# Patient Record
Sex: MALE | Employment: STUDENT | URBAN - METROPOLITAN AREA
[De-identification: names, ages, dates, MRNs, and addresses within clinical notes are randomized per-mention and may not be internally consistent; named-entity substitution may affect disease eponyms.]

---

## 2022-12-08 ENCOUNTER — HOSPITAL ENCOUNTER (OUTPATIENT)
Dept: PHYSICAL THERAPY | Age: 22
Discharge: HOME | End: 2022-12-08
Payer: COMMERCIAL

## 2022-12-08 PROCEDURE — 97161 PT EVAL LOW COMPLEX 20 MIN: CPT

## 2022-12-08 PROCEDURE — 97112 NEUROMUSCULAR REEDUCATION: CPT

## 2022-12-08 NOTE — PROGRESS NOTES
PT DAILY TREATMENT NOTE/KNEE EVAL     Patient Name: Soo Cruz  Date:2022  : 2000  [x]  Patient  Verified  Payor: Marvin Craft / Plan: Sang Burrell HMO / Product Type: HMO /    In time:1050  Out time:1140  Total Treatment Time (min): 50  Visit #: 1 of 10    Medicare/BCBS Only   Total Timed Codes (min):  8 1:1 Treatment Time:       Treatment Area: Left knee pain [M25.562]    SUBJECTIVE    CC: L knee pain  Pain: Current: 6/10 Best: 4/10 Worst: 8/10   Alleviated By: straightening   Aggravated By: prolonged sitting (>20 mins)  PLOF: Independent, Full time student at [employer]  Exercise/Activity Level: [employer] club soccer, weightlifting (leg ext, lunges). Has not returned to walking or running. Mechanism of Injury: Meniscus tear, hyperextending knee while playing soccer in 2022. Microfx of L femoral condyle. Current symptoms/Complaints: On crutches for 8 weeks. Stiffness \"eavy\" with prolonged sitting. Clicking with knee extension 2-3x/day  Previous Treatment/Compliance: Surgery in 2022. Was supposed to do PT starting in October but was not in town, unable to do PT.    Work Hx: IT work, sedentary, full time  Pt Goals: \"return to [employer] rationale: decrease inflammation, decrease pain, and increase tissue extensibility to improve the patients ability to exercise   Min Type Additional Details    [] Estim:  []Unatt       []IFC  []Premod                        []Other:  []w/ice   []w/heat  Position:  Location:    [] Estim: []Att    []TENS instruct  []NMES                    []Other:  []w/US   []w/ice   []w/heat  Position:  Location:    []  Traction: [] Cervical       []Lumbar                       [] Prone          []Supine                       []Intermittent   []Continuous Lbs:  [] before manual  [] after manual    []  Ultrasound: []Continuous   [] Pulsed                           []1MHz   []3MHz Location:  W/cm2:    []  Iontophoresis with dexamethasone         Location: [] Take home patch   [] In clinic   10 [x]  Ice ant L knee    [x]  Heat post L knee  []  Ice massage  []  Laser   []  Anodyne Position: semi-reclined with wedge      []  Laser with stim  []  Other: Position:  Location:    []  Vasopneumatic Device Pressure:       [] lo [] med [] hi   Temperature: [] lo [] med [] hi   [x] Skin assessment post-treatment:  [x]intact []redness- no adverse reaction    []redness - adverse reaction:     32 min [x]Eval                  []Re-Eval        8 min Neuromuscular Re-education:  [x]  See flow sheet :   Rationale: improve coordination, improve balance, and increase proprioception  to improve the patients ability to perform single leg interventions            With   [] TE   [] TA   [x] neuro   [] other: Patient Education:   Pt educated on diagnosis and prognosis, current impairments, postural re-education, goals/role/intent of therapy, and importance of compliance with HEP. Written HEP provided to patient and PT instructed pt on performance. Pt demo good understanding and recall. PT addressed pt questions/concerns regarding their condition. Discussed the following activity modifications:  - Avoid performing provocative movements and activities    Use of heat to reduce stiffness         OBJECTIVE    Posture: Pronation B, Mild ER of R LE, L knee in slight flexion   Observation: wearing patellar brace on R   Gait: WNL  Stairs: WNL, Reciprocal, intermittent use of 1 UE on handrail      AROM : WNL for B hips/knees  Strength (1-5)    Left Right   Hip Flexion 4+ 5    Extension 4+ 4+    Abduction 4+ 4+    Glute Max 4 4   Knee Flexion 4 5    Extension 4+ 5   Ankle PF 4 (x 9) 5 (x 25)    DF 5 5     Flexibility:  Ely's:  (-) B           Hamstrings 90/90: (+) L  Gastroc: (+) B    Palpation:  Denies TTP, notes some tenderness intermittently at L popliteal fossa    Patellar:  Patellar Positioning (Static): unable to assess d/t clothing  Patellar Tracking:  WNL B  Patellar Mobility: Hypermobile (R>L) in all directions    Girth Measurements:  (Cm) L R   Mid-patella 35.5 36   10cm above 40 41   10 cm below 34.5 34     Special Tests    Keith (-) R    Thessaly's (+) L at lateral joint line      Valgus@ 30 Degrees (-) R     Varus@ 30 Degrees (-) R    Patellar Apprehension   (-) R    Balance:   SLS on floor, EO:  L: >30sec, R: >30 sec  SLS on floor, EC: L: 4 sec, R: 11 sec  SLS on foam, EO: L: >30sec, R: >30 sec  SLS on floor, EC: L: 1 sec, R: 10 sec    Other Tests:   Quad Lag: (-) B  Single Leg Hops: x5 with incr translation, decr speed, pain at L jt line  Squat: 90deg, no pain, narrow ANA ROSA, \"heavy, clicks\", clicking decr with wider ANA ROSA, incr weight shift onto R LE  FOTO: 62/100    Pain Level (0-10 scale) post treatment: 0/10    ASSESSMENT/Changes in Function:   Please see POC    Patient will continue to benefit from skilled PT services to modify and progress therapeutic interventions, address functional mobility deficits, address ROM deficits, address strength deficits, analyze and address soft tissue restrictions, analyze and cue movement patterns, analyze and modify body mechanics/ergonomics, assess and modify postural abnormalities, address imbalance/dizziness, and instruct in home and community integration to attain remaining goals.     Justification for Eval Code Complexity:  Patient History: see above  Examination: see exam   Clinical Presentation:  stable  Clinical Decision Making : FOTO : 58 /100      [x]  See Plan of Care  []  See progress note/recertification  []  See Discharge Summary         Progress towards goals / Updated goals:  Please see POC    PLAN  [x]  Upgrade activities as tolerated     [x]  Continue plan of care  []  Update interventions per flow sheet       []  Discharge due to:_  [x]  Other:_  Cont POC 1-22x/week for 8 weeks    Tushar Alejo PT 12/8/2022  9:07 AM

## 2022-12-08 NOTE — PROGRESS NOTES
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   ========================================================================  Assessment / key information:  Pt is a 24 yo male presenting with L knee pain s/sx consistent with MD referral for post-operative pain following L lateral meniscus tear and femoral condyle microfracture (DOS 2022). Pain ranges from 4-8/10, worse with prolonged sitting (>20 mins), improved with stretching. Pt presents with full B hip/knee AROM, decr L knee/ankle strength, and decr standing balance on L. Functional deficits at this time include plyometrics, agility, and running affecting full return to soccer. Pt would benefit from skilled PT to address these deficits to assist pt to full return to PLOF activity. HEP initiated with good pt tolerance noted. There are no red flags at this time.  Further assessment as follows:    Posture: Pronation B, Mild ER of R LE, L knee in slight flexion   Observation: wearing patellar brace on R   Gait: WNL  Stairs: WNL, Reciprocal, intermittent use of 1 UE on handrail      AROM : WNL for B hips/knees  Strength (1-5)    Left Right   Hip Flexion 4+ 5    Extension 4+ 4+    Abduction 4+ 4+    Glute Max 4 4   Knee Flexion 4 5    Extension 4+ 5   Ankle PF 4 (x 9) 5 (x 25)    DF 5 5     Flexibility:  Ely's:  (-) B           Hamstrings 90/90: (+) L  Gastroc: (+) B    Palpation:  Denies TTP, notes some tenderness intermittently at L popliteal fossa    Patellar:  Patellar Positioning (Static): unable to assess d/t clothing  Patellar Tracking:  WNL B  Patellar Mobility: Hypermobile (R>L) in all directions    Girth Measurements:  (Cm) L R   Mid-patella 35.5 36   10cm above 40 41   10 cm below 34.5 34     Special Tests    Keith (-) R    Thessaly's (+) L at lateral joint line      Valgus@ 30 Degrees (-) R     Varus@ 30 Degrees (-) R    Patellar Apprehension   (-) R    Balance:   SLS on floor, EO:  L: >30sec, R: >30 sec  SLS on floor, EC: L: 4 sec, R: 11 sec  SLS on foam, EO: L: >30sec, R: >30 sec  SLS on floor, EC: L: 1 sec, R: 10 sec    Other Tests:   Quad Lag: (-) B  Single Leg Hops: x5 with incr translation, decr speed, pain at L jt line  Squat: 90deg, no pain, narrow ANA ROSA, \"heavy, clicks\", clicking decr with wider ANA ROSA, incr weight shift onto R LE  FOTO: 62/100    ========================================================================  Eval Complexity: History: LOW Complexity : Zero comorbidities / personal factors that will impact the outcome / POCExam:HIGH Complexity : 4+ Standardized tests and measures addressing body structure, function, activity limitation and / or participation in recreation  Presentation: LOW Complexity : Stable, uncomplicated  Clinical Decision Making:MEDIUM Complexity : FOTO score of 26-74Overall Complexity:LOW   Problem List: pain affecting function, decrease strength, impaired gait/ balance, decrease ADL/ functional abilitiies, and decrease activity tolerance    Vasopnuematic compression justification:  Per bilateral girth measures taken and listed above the edema is considered significant and having an impact on the patient's strength, balance, and ADLs     Treatment Plan may include any combination of the following: Therapeutic exercise, Neuromuscular reeducation, Manual therapy, Therapeutic activity, Self care/home management, Electric stim unattended , Vasopneumatic device, and Gait training  Patient / Family readiness to learn indicated by: asking questions, trying to perform skills, and interest  Persons(s) to be included in education: patient (P)  Barriers to Learning/Limitations: None  Measures taken:    Patient Goal (s): \"Return to sports\"   Patient self reported health status: good  Rehabilitation Potential: excellent    Short Term Goals: To be accomplished in  1  week:   1. Pt will be compliant with initial HEP to improve ability to independently manage symptoms. Status at last assessment: reviewed and initiated HEP  Long Term Goals: To be accomplished in  4  weeks:  1. Pt to report FOTO score of at least 82/100 pts to demonstrate improved function and quality of life. Status at last assessment: 62/100  2. Pt to demo at least 20 unilateral heel raises on L LE for improved ankle strength and stability prior to initiating plyometrics. Status at last assessment: x9   3. Pt to demo SLS on floor EC on L LE of at least 10 sec for improved proprioception prior to initiating agility drills  Status at last assessment: 4 sec   4. Pt to demo unilateral squat on L to at least 90 deg without pain and with good mechanics for improved L LE functional strength. Status at last assessment: NT      Frequency / Duration:   Patient to be seen  1-2  times per week for 10  treatments:     Patient / Caregiver education and instruction: activity modification and exercises  Therapist Signature: Gisela Morales PT Date: 09/3/8901   Certification Period: na Time: 9:06 AM   ========================================================================  I certify that the above Physical Therapy Services are being furnished while the patient is under my care.   I agree with the treatment plan and certify that this therapy is necessary. Physician Signature:        Date:       Time: ___                                            Renetta Jones MD.    Please sign and return to [facility] or you may fax the signed copy to [phone]. Thank you.

## 2022-12-16 ENCOUNTER — HOSPITAL ENCOUNTER (OUTPATIENT)
Dept: PHYSICAL THERAPY | Age: 22
Discharge: HOME | End: 2022-12-16
Payer: COMMERCIAL

## 2022-12-16 PROCEDURE — 97110 THERAPEUTIC EXERCISES: CPT

## 2022-12-16 PROCEDURE — 97112 NEUROMUSCULAR REEDUCATION: CPT

## 2022-12-16 PROCEDURE — 97530 THERAPEUTIC ACTIVITIES: CPT

## 2022-12-16 NOTE — PROGRESS NOTES
PT DAILY TREATMENT NOTE     Patient Name: Lux Right  Date:2022  : 2000  [x]  Patient  Verified  Payor: Toro Cruz / Plan: Erika Ramon HMO / Product Type: HMO /    In time: 8:04 am              Out time: 9:08 am  Total Treatment Time (min): 59  Visit #: 2 of 10    Treatment Area: Left knee pain [M25.562]    SUBJECTIVE  Pain Level (0-10 scale): 0  Any medication changes, allergies to medications, adverse drug reactions, diagnosis change, or new procedure performed?: [x] No    [] Yes (see summary sheet for update)  Subjective functional status/changes:   [] No changes reported  \"I am gonna be real with you, I graduate tomorrow, so I only did the exercises like two or three times. \"    OBJECTIVE  Modality rationale: decrease inflammation and decrease pain to improve the patients ability to perform ADLs     Min Type Additional Details    [] Estim: []Att   []Unatt                  []IFC  []Premod                                            []NMES    []Other:  []w/ice   []w/heat  Position:  Location:    []  Traction: [] Cervical       [] Lumbar                       [] Supine        [] Prone                       []Intermittent   []Continuous Lbs:  [] before manual  [] after manual    []  Ultrasound: []Continuous   [] Pulsed                           []1MHz   []3MHz Location:  W/cm2:    []  Iontophoresis with dexamethasone         Location: [] Take home patch   [] In clinic   10 [x]  Ice (anterior)     [x]  Heat (posterior)  []  Ice massage Position: semi-reclined with LEs elevated on wedge  Location: (L) knee    []  Vasopneumatic Device  If using vaso (only need to measure limb vaso being performed on)      pre-treatment girth :       post-treatment girth :       measured at (landmark location)  Pressure: [] lo [] med [] hi   Temp: [] lo [] med [] hi   [x] Skin assessment post-treatment:  [x]intact    []redness- no adverse reaction []redness - adverse reaction:     25 min Therapeutic Exercise:  [x] See flow sheet: initiated per IE   Rationale: increase ROM, increase strength, and improve coordination to improve the patients ability to perform ADLs     14 min Therapeutic Activity:  [x]  See flow sheet: initiated per IE   Rationale: increase strength, improve coordination, improve balance, and increase proprioception  to improve the patients ability to perform ADLs     15 min Neuromuscular Re-education:  [x]  See flow sheet: initiated per IE   Rationale: increase strength, improve coordination, improve balance, and increase proprioception  to improve the patients ability to progress towards eventual return to recreation          with TE min Patient Education: [x] Review HEP from IE     Other Objective/Functional Measures:       Pain Level (0-10 scale) post treatment: 0    ASSESSMENT/Changes in Function:   Good tolerance to treatment today with patient req 100% verbal/tactile cueing and demo for proper form/technique with all newly introduced therex. Patient will continue to benefit from skilled PT services to modify and progress therapeutic interventions, address functional mobility deficits, address ROM deficits, address strength deficits, analyze and address soft tissue restrictions, analyze and cue movement patterns, analyze and modify body mechanics/ergonomics, assess and modify postural abnormalities, and address imbalance/dizziness to attain remaining goals. []  See Plan of Care  []  See progress note/recertification  []  See Discharge Summary         Progress towards goals / Updated goals -:  Short Term Goals: To be accomplished in  1  week:   1. Pt will be compliant with initial HEP to improve ability to independently manage symptoms.   Status at last assessment: reviewed and initiated HEP  Current: goal progressing; pt notes intermittent compliance with intent to increase consistency after graduation tomorrow (12/16/22)  Long Term Goals: To be accomplished in  4  weeks:  1. Pt to report FOTO score of at least 82/100 pts to demonstrate improved function and quality of life. Status at last assessment: 62/100  2. Pt to demo at least 20 unilateral heel raises on L LE for improved ankle strength and stability prior to initiating plyometrics. Status at last assessment: x9   3. Pt to demo SLS on floor EC on L LE of at least 10 sec for improved proprioception prior to initiating agility drills  Status at last assessment: 4 sec   4. Pt to demo unilateral squat on L to at least 90 deg without pain and with good mechanics for improved L LE functional strength.   Status at last assessment: NT   Current: goal progressing; pt demos unilateral squat to 60 deg without pain and good mechanics, quadriceps fasciculations (12/16/22)   (Progress Note due by 1/8/22)    PLAN  [x]  Upgrade activities as tolerated     [x]  Continue plan of care  []  Update interventions per flow sheet       []  Discharge due to:_  [x]  Other: assess response to treatment     Sandra Bowens, PTA 12/16/2022

## 2022-12-22 ENCOUNTER — HOSPITAL ENCOUNTER (OUTPATIENT)
Dept: PHYSICAL THERAPY | Age: 22
Discharge: HOME | End: 2022-12-22
Payer: COMMERCIAL

## 2022-12-22 PROCEDURE — 97110 THERAPEUTIC EXERCISES: CPT

## 2022-12-22 PROCEDURE — 97112 NEUROMUSCULAR REEDUCATION: CPT

## 2022-12-22 PROCEDURE — 97530 THERAPEUTIC ACTIVITIES: CPT

## 2022-12-22 NOTE — PROGRESS NOTES
PT DAILY TREATMENT NOTE 8-14    Patient Name: Gill Benson  Date:2022  : 2000  [x]  Patient  Verified  Payor: Adebayo August / Plan: Yolanda Paredes HMO / Product Type: HMO /    In time: 1055              Out time: 1150   Total Treatment Time (min): 54  Visit #: 3 of 10    Treatment Area: Left knee pain [M25.562]    SUBJECTIVE  Pain Level (0-10 scale): 0  Any medication changes, allergies to medications, adverse drug reactions, diagnosis change, or new procedure performed?: [x] No    [] Yes (see summary sheet for update)  Subjective functional status/changes:   [] No changes reported  \"The heat behind my knee is helping. \"    OBJECTIVE  Modality rationale: decrease inflammation and decrease pain to improve the patients ability to perform ADLs     Min Type Additional Details    [] Estim: []Att   []Unatt                  []IFC  []Premod                                            []NMES    []Other:  []w/ice   []w/heat  Position:  Location:    []  Traction: [] Cervical       [] Lumbar                       [] Supine        [] Prone                       []Intermittent   []Continuous Lbs:  [] before manual  [] after manual    []  Ultrasound: []Continuous   [] Pulsed                           []1MHz   []3MHz Location:  W/cm2:    []  Iontophoresis with dexamethasone         Location: [] Take home patch   [] In clinic   10 [x]  Ice (anterior)     [x]  Heat (posterior)  []  Ice massage Position: semi-reclined with LEs elevated on wedge  Location: (L) knee    []  Vasopneumatic Device  If using vaso (only need to measure limb vaso being performed on)      pre-treatment girth :       post-treatment girth :       measured at (landmark location)  Pressure: [] lo [] med [] hi   Temp: [] lo [] med [] hi   [x] Skin assessment post-treatment:  [x]intact    []redness- no adverse reaction                                                                                 []redness - adverse reaction:     24 min Therapeutic Exercise: [x] See flow sheet:   Active warm up, 2 laps each: Hamstring kicks, Open/close the gaits, heel walk/toe walk    Agility Drills: 2 laps of each  In/In/Out/Out forward  2 forward/1 back   Lateral 2 over/1back     Rationale: increase ROM, increase strength, and improve coordination to improve the patients ability to perform ADLs     8 min Therapeutic Activity:  [x]  See flow sheet :   Rationale: increase strength, improve coordination, improve balance, and increase proprioception to improve the patient's ability to perform squats, lunges, and plyometrics. 13 min Neuromuscular Re-education:  [x]  See flow sheet:    Rationale: increase strength, improve coordination, improve balance, and increase proprioception  to improve the patients ability to progress towards eventual return to recreation          with TE min Patient Education: [x] Review HEP      Other Objective/Functional Measures:   SLS on floor EC: B: >30 sec  SLS on foam EC: L: 28 sec, R: 20 sec  S/l Heel raise: x15 on L    Pain Level (0-10 scale) post treatment: 0    ASSESSMENT/Changes in Function:   Pt met LTG #3 for SLS on floor EC, demos almost equal ability on foam. Tolerates all interventions well today without pain. Plan to cont L LE strengthening, plyos, and agility in progress toward to soccer. Patient will continue to benefit from skilled PT services to modify and progress therapeutic interventions, address functional mobility deficits, address ROM deficits, address strength deficits, analyze and address soft tissue restrictions, analyze and cue movement patterns, analyze and modify body mechanics/ergonomics, assess and modify postural abnormalities, and address imbalance/dizziness to attain remaining goals. []  See Plan of Care  []  See progress note/recertification  []  See Discharge Summary         Progress towards goals / Updated goals -:  Short Term Goals: To be accomplished in  1  week:   1.  Pt will be compliant with initial HEP to improve ability to independently manage symptoms. Status at last assessment: reviewed and initiated HEP  Current: goal progressing; pt notes intermittent compliance with intent to increase consistency after graduation tomorrow (12/16/22)  Long Term Goals: To be accomplished in  4  weeks:  1. Pt to report FOTO score of at least 82/100 pts to demonstrate improved function and quality of life. Status at last assessment: 62/100  2. Pt to demo at least 20 unilateral heel raises on L LE for improved ankle strength and stability prior to initiating plyometrics. Status at last assessment: x9   Current 12/22/22 goal progressing x15  3. Pt to demo SLS on floor EC on L LE of at least 10 sec for improved proprioception prior to initiating agility drills  Status at last assessment: 4 sec   Current: 12/22/22 goal met, 30 sec  4. Pt to demo unilateral squat on L to at least 90 deg without pain and with good mechanics for improved L LE functional strength.   Status at last assessment: NT   Current: goal progressing; pt demos unilateral squat to 60 deg without pain and good mechanics, quadriceps fasciculations (12/16/22)       (Progress Note due by 1/8/22)    PLAN  [x]  Upgrade activities as tolerated     [x]  Continue plan of care  []  Update interventions per flow sheet       []  Discharge due to:_  []  Other:     Nicol Mai PT 12/22/2022    Future Appointments   Date Time Provider Abbey King   12/23/2022 11:00 AM Flash Reid, PT [facility]   12/29/2022  7:45 AM Mavis Nunez PTA [facility]   12/30/2022 11:00 AM Ino Kothari, PT [facility]

## 2022-12-23 ENCOUNTER — HOSPITAL ENCOUNTER (OUTPATIENT)
Dept: PHYSICAL THERAPY | Age: 22
Discharge: HOME | End: 2022-12-23
Payer: COMMERCIAL

## 2022-12-23 PROCEDURE — 97112 NEUROMUSCULAR REEDUCATION: CPT

## 2022-12-23 PROCEDURE — 97530 THERAPEUTIC ACTIVITIES: CPT

## 2022-12-23 PROCEDURE — 97110 THERAPEUTIC EXERCISES: CPT

## 2022-12-23 PROCEDURE — 97116 GAIT TRAINING THERAPY: CPT

## 2022-12-23 NOTE — PROGRESS NOTES
PT DAILY TREATMENT NOTE 8-    Patient Name: Asad Gaspar  Date:2022  : 2000  [x]  Patient  Verified  Payor: Taniya Francis / Plan: Kevin Mota HMO / Product Type: HMO /    In time: 11:00  Out time: 12:00  Total Treatment Time (min): 60  Visit #: 4 of 10    Medicare/[facility] Time Tracking (below)   Total Timed Codes (min):  n/a 1:1 Treatment Time:  n/a       Treatment Area: Left knee pain [M25.562]    SUBJECTIVE  Pain Level (0-10 scale): 5/10 left lateral hamstring, 0/10 knee pain  Any medication changes, allergies to medications, adverse drug reactions, diagnosis change, or new procedure performed?: [x] No    [] Yes (see summary sheet for update)  Subjective functional status/changes:   [] No changes reported  \"Just sore on the outside of my leg\"    OBJECTIVE  Modality rationale: decrease pain to improve the patients ability to return to sport   Min Type Additional Details    [] Estim: []Att   []Unatt  []TENS instruct                 []IFC  []Premod []NMES                       []Other:  []w/US   []w/ice   []w/heat  Position:  Location:    []  Traction: [] Cervical       []Lumbar                       [] Prone          []Supine                       []Intermittent   []Continuous Lbs:  [] before manual  [] after manual    []  Ultrasound: []Continuous   [] Pulsed                           []1MHz   []3MHz Location:  W/cm2:    []  Iontophoresis with dexamethasone         Location: [] Take home patch   [] In clinic   10 [x]  Ice     [x]  heat  []  Ice massage Position: supine  Location: ice anterior knee, heat posterior knee    []  Vasopneumatic Device:  If using vaso (only need to measure limb vaso being performed on)      pre-treatment girth :       post-treatment girth :       measured at (landmark location)  Pressure: [] lo [] med [] hi   Temp: [] lo [] med [] Hi    Position:  Location:   [x] Skin assessment post-treatment:  []intact []redness- no adverse reaction       []redness - adverse reaction:       15 min Therapeutic Exercise:  [x] See flow sheet : core and glute exercises   Rationale: increase ROM and increase strength to improve the patients ability to return to sport    10 min Therapeutic Activity:  [x]  See flow sheet : squatting re-ed   Rationale: increase ROM, increase strength, improve coordination, improve balance, and increase proprioception to improve the patients ability to return to sport     10 min Neuromuscular Re-education:  [x]  See flow sheet : balance therex   Rationale: increase strength, improve coordination, improve balance, and increase proprioception  to improve the patients ability to return to sport    15 min Gait Training:  band walks, agility drills   Rationale: improve strength to prepare for return to sport          Billed With/As:   [x] TE   [] TA   [] Neuro   [] Self Care Patient Education: [x] Review HEP    [] Progressed/Changed HEP based on:   [] positioning   [] body mechanics   [] transfers   [] heat/ice application        Other Objective/Functional Measures: Extensive cuing for core, glute engagement and form/alignment in squats, dead lift and planks. No complaints of knee pain. Pain Level (0-10 scale) post treatment: 0/10    ASSESSMENT  Changes in Function: Focused on core engagement and glute activation to reduce stress on knee. Updated HEP. Patient will continue to benefit from skilled PT services to modify and progress therapeutic interventions, address functional mobility deficits, address ROM deficits, address strength deficits, analyze and address soft tissue restrictions, analyze and cue movement patterns, analyze and modify body mechanics/ergonomics, and assess and modify postural abnormalities to attain remaining goals. []  See Plan of Care  []  See progress note/recertification  []  See Discharge Summary         Progress towards goals / Updated goals:  Short Term Goals: To be accomplished in  1  week:   1.  Pt will be compliant with initial HEP to improve ability to independently manage symptoms. Status at last assessment: reviewed and initiated HEP  Current: goal progressing; pt notes intermittent compliance with intent to increase consistency after graduation tomorrow (12/16/22)  Long Term Goals: To be accomplished in  4  weeks:  1. Pt to report FOTO score of at least 82/100 pts to demonstrate improved function and quality of life. Status at last assessment: 62/100  2. Pt to demo at least 20 unilateral heel raises on L LE for improved ankle strength and stability prior to initiating plyometrics. Status at last assessment: x9   Current 12/22/22 goal progressing x15  3. Pt to demo SLS on floor EC on L LE of at least 10 sec for improved proprioception prior to initiating agility drills  Status at last assessment: 4 sec   Current: 12/22/22 goal met, 30 sec  4. Pt to demo unilateral squat on L to at least 90 deg without pain and with good mechanics for improved L LE functional strength.   Status at last assessment: NT   Current: goal progressing; focused on squat mechanics to improve glute and core engagement and reduce stress on knee (12/23/22)     (Progress Note due by 1/8/22)    PLAN  [x]  Upgrade activities as tolerated     [x]  Continue plan of care  []  Update interventions per flow sheet       []  Discharge due to:_  []  Other:_      Sammie Naik, LISA 12/23/2022  11:56 AM    Future Appointments   Date Time Provider Abbey King   12/29/2022  7:45 AM Radha Cooper PTA [facility]   12/30/2022 11:00 AM Kvng Rider PT [facility]

## 2022-12-30 ENCOUNTER — HOSPITAL ENCOUNTER (OUTPATIENT)
Dept: PHYSICAL THERAPY | Age: 22
End: 2022-12-30
Payer: COMMERCIAL

## 2022-12-30 PROCEDURE — 97110 THERAPEUTIC EXERCISES: CPT

## 2022-12-30 PROCEDURE — 97530 THERAPEUTIC ACTIVITIES: CPT

## 2022-12-30 PROCEDURE — 97112 NEUROMUSCULAR REEDUCATION: CPT

## 2022-12-30 PROCEDURE — 97116 GAIT TRAINING THERAPY: CPT

## 2022-12-30 NOTE — PROGRESS NOTES
PT DAILY TREATMENT NOTE 8-14    Patient Name: Taisha Lujan  Date:2022  : 2000  [x]  Patient  Verified  Payor: Indigo Rust / Plan: Carina Schultz HMO / Product Type: HMO /    In time: 11:01  Out time: 12:03  Total Treatment Time (min): 58  Visit #: 5 of 10    Medicare/[facility] Time Tracking (below)   Total Timed Codes (min):  n/a 1:1 Treatment Time:  n/a       Treatment Area: Left knee pain [M25.562]    SUBJECTIVE  Pain Level (0-10 scale): 0  Any medication changes, allergies to medications, adverse drug reactions, diagnosis change, or new procedure performed?: [x] No    [] Yes (see summary sheet for update)  Subjective functional status/changes:   [] No changes reported  \"Doing great.  Just feeling like i'm more mentally scared of things\"    OBJECTIVE  Modality rationale: decrease pain to improve the patients ability to return to sport   Min Type Additional Details   10 [x]  Ice     [x]  heat  []  Ice massage Position: semi-reclined  Location: ice anterior knee, heat posterior knee   [x] Skin assessment post-treatment:  []intact []redness- no adverse reaction       []redness - adverse reaction:       10 min Therapeutic Exercise:  [x] See flow sheet : core and glute exercises   Rationale: increase ROM and increase strength to improve the patients ability to return to sport    10 min Therapeutic Activity:  [x]  See flow sheet : squatting re-ed   Rationale: increase ROM, increase strength, improve coordination, improve balance, and increase proprioception to improve the patients ability to return to sport     15 min Neuromuscular Re-education:  [x]  See flow sheet :    Active warm up - inch worms; walking lunge for hip flexor stretch  -added steam boats (B) on foam with BTB   -added Single leg Eccentric HS slide outs (supine with slider under one heel   -single leg decline squat 2x10  -single leg head buts (soleus strength)  -Plank with alternating hip extension   -added single leg dead lift for L limb stability and posterior chain strength work    Rationale: increase strength, improve coordination, improve balance, and increase proprioception  to improve the patients ability to return to sport    17 min Gait Training:  band walks,     Ladder agility drills - 2 in moving lateral down length of ladder  Scissors moving laterally  Lateral shuffle -forward and retro    Skipping: 4x40'   Rationale: improve strength to prepare for return to sport          Billed With/As:   [x] TE   [] TA   [] Neuro   [] Self Care Patient Education: [x] Review HEP    [] Progressed/Changed HEP based on:   [] positioning   [] body mechanics   [] transfers   [] heat/ice application        Other Objective/Functional Measures:   SL squat (L): >85 deg in decline squat    Pain Level (0-10 scale) post treatment: 0    ASSESSMENT  Great progress today. Responds well to ladders with no c/o pain but appropriate fatigue. Form cues for straight leg dead lift (to bias the glutes and hamstrings). Increasing depth of single leg squats. Patient will continue to benefit from skilled PT services to modify and progress therapeutic interventions, address functional mobility deficits, address ROM deficits, address strength deficits, analyze and address soft tissue restrictions, analyze and cue movement patterns, analyze and modify body mechanics/ergonomics, and assess and modify postural abnormalities to attain remaining goals. []  See Plan of Care  []  See progress note/recertification  []  See Discharge Summary         Progress towards goals / Updated goals:  Short Term Goals: To be accomplished in  1  week:   1. Pt will be compliant with initial HEP to improve ability to independently manage symptoms. Status at last assessment: reviewed and initiated HEP  Current: goal progressing; pt notes intermittent compliance with intent to increase consistency after graduation tomorrow (12/16/22)  Long Term Goals: To be accomplished in  4  weeks:  1.  Pt to report FOTO score of at least 82/100 pts to demonstrate improved function and quality of life. Status at last assessment: 62/100  2. Pt to demo at least 20 unilateral heel raises on L LE for improved ankle strength and stability prior to initiating plyometrics. Status at last assessment: x9   Current 12/22/22 goal progressing x15  3. Pt to demo SLS on floor EC on L LE of at least 10 sec for improved proprioception prior to initiating agility drills  Status at last assessment: 4 sec   Current: 12/22/22 goal met, 30 sec  4. Pt to demo unilateral squat on L to at least 90 deg without pain and with good mechanics for improved L LE functional strength.   Status at last assessment: NT   Current: goal progressing; > 85 deg (12/30/22)     (Progress Note due by 1/8/22)    PLAN  [x]  Upgrade activities as tolerated     [x]  Continue plan of care  []  Update interventions per flow sheet       []  Discharge due to:_  []  Other:_      Jessica Ruiz PT 12/30/2022  11:56 AM    Future Appointments   Date Time Provider Abbey King   12/30/2022 11:00 AM Ruthann Mathew, PT [facility]

## 2023-01-05 ENCOUNTER — HOSPITAL ENCOUNTER (OUTPATIENT)
Dept: PHYSICAL THERAPY | Age: 23
Discharge: HOME | End: 2023-01-05
Payer: COMMERCIAL

## 2023-01-05 PROCEDURE — 97110 THERAPEUTIC EXERCISES: CPT

## 2023-01-05 PROCEDURE — 97112 NEUROMUSCULAR REEDUCATION: CPT

## 2023-01-05 PROCEDURE — 97530 THERAPEUTIC ACTIVITIES: CPT

## 2023-01-05 NOTE — PROGRESS NOTES
PT DAILY TREATMENT NOTE     Patient Name: Jacqueline Monsalve. Date:2023  : 2000  [x]  Patient  Verified  Payor: Cori Skaggs / Plan: Moiz Umana HMO / Product Type: HMO /    In time:12:33  Out time:1:41  Total Treatment Time (min): 68  Visit #: 6 of 10    Medicare/BCBS Only   Total Timed Codes (min):   1:1 Treatment Time:         Treatment Area: Left knee pain [M25.562]    SUBJECTIVE  Pain Level (0-10 scale): 0  Any medication changes, allergies to medications, adverse drug reactions, diagnosis change, or new procedure performed?: [x] No    [] Yes (see summary sheet for update)  Subjective functional status/changes:   [] No changes reported  \"I am feeling pretty good today. \"    OBJECTIVE    Modality rationale: decrease pain to improve the patients ability to relax and sleep following therapy session to improve restorative sleep patterns.     Min Type Additional Details    [x] Estim:  []Unatt       []IFC  []Premod                        []Other:  []w/ice   []w/heat  Position:   Location:     [] Estim: []Att    []TENS instruct  []NMES                    []Other:  []w/US   []w/ice   []w/heat  Position:  Location:    []  Traction: [] Cervical       []Lumbar                       [] Prone          []Supine                       []Intermittent   []Continuous Lbs:  [] before manual  [] after manual    []  Ultrasound: []Continuous   [] Pulsed                           []1MHz   []3MHz W/cm2:  Location:    []  Iontophoresis with dexamethasone         Location: [] Take home patch   [] In clinic   10 [x]  Ice     [x]  heat  []  Ice massage  []  Laser   []  Anodyne Position: reclined  Location: heat to post knee, CP to ant knee    []  Laser with stim  []  Other:  Position:  Location:    []  Vasopneumatic Device    []  Right     []  Left  Pre-treatment girth:  Post-treatment girth:  Measured at (location):  Pressure:       [] lo [] med [] hi   Temperature: [] lo [] med [] hi   [x] Skin assessment post-treatment: [x]intact []redness- no adverse reaction    []redness - adverse reaction:     14 min Therapeutic Exercise:  [x] See flow sheet :   Rationale: increase ROM and increase strength to improve the patients ability to perform gait and transfers with improved LE strength and mobility. 14 min Therapeutic Activity:  [x]  See flow sheet :   Rationale: increase strength, improve coordination, improve balance, and increase proprioception  to improve the patients ability to perform transfers, stair negotiation, and floor <> waist lifts. Patient education: outcomes measure testing     30 min Neuromuscular Re-education:  [x]  See flow sheet :  Plyometrics-  DL land from 6\" step 10 x 2  SL land from 2\" step 8 x 2   Lat step up hops 6\" 2 x 10 with contra UE raise   Rationale: increase strength, improve coordination, improve balance and increase proprioception  to improve the patients ability to perform stair negotiation and functional mobility in the home/community with improved quadriceps control and decreased falls risk. With   [] TE   [] TA   [] neuro   [] other: Patient Education: [x] Review HEP    [] Progressed/Changed HEP based on:   [] positioning   [] body mechanics   [] transfers   [] heat/ice application    [] other:      Other Objective/Functional Measures:     LE Strength:   Right (/5) Left (/5)   Hip     Flexion 5 5             Abduction 5 5             Extension 5 5   Knee   Extension 5 4+              Flexion 5 4+   Ankle   Dorsiflexion 5 5               PF 25 (reps) 15 (reps)     Stair Negotiation: reciprocal  Balance:   mCTSIB in SLS 30\" all conditions except EC foam at 15\"  30\" Lateral Hop Right 26 repetitions, Left 12 repetitions       Pain Level (0-10 scale) post treatment: 0    ASSESSMENT/Changes in Function:   Patient has attended PT for 6 sessions for the treatment of left knee pain. The patient demonstrates moderate progress at this time.  He demonstrates gross improvement in left knee/hip strength and improved balance abilities in left single leg stance. He also reports decreased pain levels on average in his left knee and improved standing tolerance. He cont to demo decreased single limb strength in left leg as evidenced by single leg squat ability and left single leg hop testing. Additional assessment includes:  Subjective Gains: decreased pain on average  Subjective Deficits: don't quite trust knee yet, impaired left single leg stance balance during dynamic tasks, some discomfort in left knee with prolonged standing/walking  % improvement: 40-45%  Pain   Average: 1-2/10       Best: 0/10     Worst: 7/10  Patient Goal: \"return to gym/soccer\"    Patient will continue to benefit from skilled PT services to modify and progress therapeutic interventions, address functional mobility deficits, address ROM deficits, address strength deficits, analyze and address soft tissue restrictions, analyze and cue movement patterns, analyze and modify body mechanics/ergonomics, assess and modify postural abnormalities and address imbalance/dizziness to attain remaining goals. []  See Plan of Care  [x]  See progress note/recertification  []  See Discharge Summary         Progress towards goals / Updated goals:  Short Term Goals: To be accomplished in  1  week:   1. Pt will be compliant with initial HEP to improve ability to independently manage symptoms. Status at last assessment: reviewed and initiated HEP  Current: goal progressing; pt notes intermittent compliance with intent to increase consistency after graduation tomorrow (12/16/22)  Long Term Goals: To be accomplished in  4  weeks:  1. Pt to report FOTO score of at least 82/100 pts to demonstrate improved function and quality of life. Status at last assessment: 62/100  Current: not met, FOTO 59 (1/5/23)  2. Pt to demo at least 20 unilateral heel raises on L LE for improved ankle strength and stability prior to initiating plyometrics.   Status at last assessment: x9   Current 12/22/22 goal progressing x15  3. Pt to demo SLS on floor EC on L LE of at least 10 sec for improved proprioception prior to initiating agility drills  Status at last assessment: 4 sec   Current: 12/22/22 goal met, 30 sec  4. Pt to demo unilateral squat on L to at least 90 deg without pain and with good mechanics for improved L LE functional strength.   Status at last assessment: NT   Current: goal progressing; > 85 deg (12/30/22)     (Progress Note due by 1/8/22)    PLAN  [x]  Upgrade activities as tolerated     [x]  Continue plan of care  []  Update interventions per flow sheet       []  Discharge due to:_  []  Other:_      Pino Gutierrez 1/5/2023  10:14 AM    Future Appointments   Date Time Provider Abbey King   1/5/2023 12:30 PM Luvalerie Fail [facility]   1/12/2023 12:30 PM Estbruce Merrill, PTA [facility]   1/13/2023 10:00 AM Vineet Heimlich, PT [facility]   1/19/2023  9:30 AM Luane Fail [facility]   1/20/2023 12:30 PM Eston [place]els[place], PTA [facility]   1/26/2023 10:00 AM Eston [place], PTA [facility]   1/27/2023 10:00 AM Vineet Heimlich, PT [facility]

## 2023-01-06 NOTE — PROGRESS NOTES
[facility] AT [address]  Phone: [phone] Fax: [phone]  PROGRESS NOTE  Patient Name: Rashid Kemp : 2000   Treatment/Medical Diagnosis: Left knee pain [M25.562]   Referral Source: Magdi Logan MD     Date of Initial Visit: 22 Attended Visits: 6 Missed Visits: 1     SUMMARY OF TREATMENT  Pt is a 22 yo male presenting with L knee pain s/sx consistent with MD referral for post-operative pain following L lateral meniscus tear and femoral condyle microfracture (DOS 2022). Treatment has consisted of: therapeutic exercise to improve LE/lumbar strength/mobility; therapeutic activities to improve transfer/lift/carry ability; neuromuscular re-education to improve balance, core stability, neuromuscular control with lifting; physical agent/modality for symptom management; manual therapy for symptom relief and muscle flexibility; patient education to improve symptom management; self Care training; home safety training; stair training, and functional mobility training. CURRENT STATUS  Patient has attended PT for 6 sessions for the treatment of left knee pain. The patient demonstrates moderate progress at this time. He demonstrates gross improvement in left knee/hip strength and improved balance abilities in left single leg stance. He also reports decreased pain levels on average in his left knee and improved standing tolerance. He cont to demo decreased single limb strength in left leg as evidenced by single leg squat ability and left single leg hop testing.  Additional assessment includes:  Subjective Gains: decreased pain on average  Subjective Deficits: don't quite trust knee yet, impaired left single leg stance balance during dynamic tasks, some discomfort in left knee with prolonged standing/walking  % improvement: 40-45%  Pain   Average: 1-2/10                     Best: 0/10 Worst: 7/10  Patient Goal: \"return to gym/soccer\"    Objective Measures:   LE Strength:    Right (/5) Left (/5)   Hip     Flexion 5 5             Abduction 5 5             Extension 5 5   Knee   Extension 5 4+              Flexion 5 4+   Ankle   Dorsiflexion 5 5               PF 25 (reps) 15 (reps)      Stair Negotiation: reciprocal  Balance:   mCTSIB in SLS 30\" all conditions except EC foam at 15\"  30\" Lateral Hop Right 26 repetitions, Left 12 repetitions    Current Goals:  Short Term Goals: To be accomplished in  1  week:   1. Pt will be compliant with initial HEP to improve ability to independently manage symptoms. Status at last assessment: reviewed and initiated HEP  Current: goal progressing; pt notes intermittent compliance with intent to increase consistency after graduation tomorrow (12/16/22)  Long Term Goals: To be accomplished in  4  weeks:  1. Pt to report FOTO score of at least 82/100 pts to demonstrate improved function and quality of life. Status at last assessment: 62/100  Current: not met, FOTO 59 (1/5/23)  2. Pt to demo at least 20 unilateral heel raises on L LE for improved ankle strength and stability prior to initiating plyometrics. Status at last assessment: x9   Current 12/22/22 goal progressing x15  3. Pt to demo SLS on floor EC on L LE of at least 10 sec for improved proprioception prior to initiating agility drills  Status at last assessment: 4 sec   Current: 12/22/22 goal met, 30 sec  4. Pt to demo unilateral squat on L to at least 90 deg without pain and with good mechanics for improved L LE functional strength. Status at last assessment: NT   Current: goal progressing; > 85 deg (12/30/22)      New Goals to be achieved in __10__  treatments:  1. Pt to report FOTO score of at least 82/100 pts to demonstrate improved function and quality of life. Status at last assessment: FOTO 59   2.  Pt to demo at least 20 unilateral heel raises on L LE for improved ankle strength and stability prior to initiating plyometrics. Status at last assessment: x15  3. Pt to demo SLS on foam EC on L LE of at least 30 sec for improved proprioception prior to initiating agility drills  Status at last assessment:15\"  4. Pt to demo unilateral squat on L to at least 90 deg without pain and with good mechanics for improved L LE functional strength. Status at last assessment: > 85 deg  5. Pt to demo at least 85% symmetry in single leg lateral hop testing to demo improved safety with return to sport. Status at last assessment: right 26 reps, left 12 reps      RECOMMENDATIONS  Pt to continue with skilled therapy for 2x/week for 10 sessions to improve ease with higher level balance activities, restore full symmetry to squatting, and improve safety with running/jumping to facilitate return to sport. If you have any questions/comments please contact us directly at [phone]   Thank you for allowing us to assist in the care of your patient. Therapist Signature: Lynette Rome Date: 1/6/2023   Reporting Period  12/8/22-1/5/23 Time: 7:11 AM   NOTE TO PHYSICIAN:  PLEASE COMPLETE THE ORDERS BELOW AND FAX TO   [facility] at [facility]: [phone]. If you are unable to process this request in 24 hours please contact our office: [phone].  ___ I have read the above report and request that my patient continue as recommended.   ___ I have read the above report and request that my patient continue therapy with the following changes/special instructions:_________________________________________________________   ___ I have read the above report and request that my patient be discharged from therapy.      Physician Signature:        Date:       Time:                                                        Magdi Logan MD.

## 2023-01-12 ENCOUNTER — HOSPITAL ENCOUNTER (OUTPATIENT)
Dept: PHYSICAL THERAPY | Age: 23
Discharge: HOME | End: 2023-01-12
Payer: COMMERCIAL

## 2023-01-12 PROCEDURE — 97530 THERAPEUTIC ACTIVITIES: CPT

## 2023-01-12 PROCEDURE — 97112 NEUROMUSCULAR REEDUCATION: CPT

## 2023-01-12 PROCEDURE — 97110 THERAPEUTIC EXERCISES: CPT

## 2023-01-12 NOTE — PROGRESS NOTES
PT DAILY TREATMENT NOTE     Patient Name: Ignacio Hardin. Date:2023  : 2000  [x]  Patient  Verified  Payor: Christi Fernández / Plan: Richie Tamanna HMO / Product Type: HMO /    In time: 12:30 pm              Out time: 1:31 pm  Total Treatment Time (min): 61  Visit #: 1 of 10    Medicare/BCBS Only   Total Timed Codes (min):   1:1 Treatment Time:         Treatment Area: Left knee pain [M25.562]    SUBJECTIVE  Pain Level (0-10 scale): 0  Any medication changes, allergies to medications, adverse drug reactions, diagnosis change, or new procedure performed?: [x] No    [] Yes (see summary sheet for update)  Subjective functional status/changes:   [] No changes reported  \"I have been pretty good. \"    OBJECTIVE    Modality rationale: decrease pain to improve the patients ability to relax and sleep following therapy session to improve restorative sleep patterns. Min Type Additional Details   10 [x]  Ice     [x]  heat   Position: reclined  Location: heat to post knee, CP to ant knee   [x] Skin assessment post-treatment:  [x]intact []redness- no adverse reaction    []redness - adverse reaction:     13 min Therapeutic Exercise:  [x] See flow sheet:     Active warmup: high knees, open/close the gaits (hip flexion/ADD/IR to flexion/ABD/ER), toe walking, inchworm, walking lunges   Rationale: increase ROM and increase strength to improve the patients ability to perform gait and transfers with improved LE strength and mobility. 13 min Therapeutic Activity:  [x]  See flow sheet:    Rationale: increase strength, improve coordination, improve balance, and increase proprioception  to improve the patients ability to perform transfers, stair negotiation, and floor <> waist lifts.   Patient education: outcomes measure testing     25 min Neuromuscular Re-education:  [x]  See flow sheet :  Plyometrics-  DL land from 6\" step 10 x 2  SL land from 2\" step 8 x 2     PNF D1 and D2 patterns initially on BOSU, then reversion to foam pad with 3# ankle weight on midfoot   Rationale: increase strength, improve coordination, improve balance and increase proprioception  to improve the patients ability to perform stair negotiation and functional mobility in the home/community with improved quadriceps control and decreased falls risk. With   [] TE   [] TA   [] neuro   [] other: Patient Education: [x] Review HEP    [] Progressed/Changed HEP based on:   [] positioning   [] body mechanics   [] transfers   [] heat/ice application    [] other:      Other Objective/Functional Measures:   SL deadlifts (RDLs): perform one set with weight in ipsilateral UE (quad bias) and then one set with weight held in contralateral UE (glute med bias)       Pain Level (0-10 scale) post treatment: 0    ASSESSMENT/Changes in Function:   Patient req cueing for cognizant triple extension to improve potential for shock absorption during eccentric landing attempts. Genu valgus evident during resisted monster walks and sidestepping with patient able to correct when cognizant. Patient will continue to benefit from skilled PT services to modify and progress therapeutic interventions, address functional mobility deficits, address ROM deficits, address strength deficits, analyze and address soft tissue restrictions, analyze and cue movement patterns, analyze and modify body mechanics/ergonomics, assess and modify postural abnormalities and address imbalance/dizziness to attain remaining goals. []  See Plan of Care  [x]  See progress note/recertification  []  See Discharge Summary         Progress towards goals / Updated goals:  1. Pt to report FOTO score of at least 82/100 pts to demonstrate improved function and quality of life. Status at last assessment: FOTO 59   2. Pt to demo at least 20 unilateral heel raises on L LE for improved ankle strength and stability prior to initiating plyometrics. Status at last assessment: x15  3.  Pt to demo SLS on foam EC on L LE of at least 30 sec for improved proprioception prior to initiating agility drills  Status at last assessment:15\"  4. Pt to demo unilateral squat on L to at least 90 deg without pain and with good mechanics for improved L LE functional strength. Status at last assessment: > 85 deg  5. Pt to demo at least 85% symmetry in single leg lateral hop testing to demo improved safety with return to sport.   Status at last assessment: right 26 reps, left 12 reps        (Progress Note due by 2/5/22)    PLAN  [x]  Upgrade activities as tolerated     [x]  Continue plan of care  []  Update interventions per flow sheet       []  Discharge due to:_  []  Other:_      Irma Flores PTA 1/12/2023     Future Appointments   Date Time Provider Abbey King   1/13/2023 10:00 AM Paco Oquendo, PT [facility]   1/19/2023  9:30 AM Todd Cuellar [facility]   1/20/2023 12:30 PM Jessie Mcdermott PTA [facility]   1/26/2023 10:00 AM Jessie Mcdermott PTA [facility]   1/27/2023 10:00 AM Paco Oquendo PT [facility]

## 2023-01-13 ENCOUNTER — HOSPITAL ENCOUNTER (OUTPATIENT)
Dept: PHYSICAL THERAPY | Age: 23
Discharge: HOME | End: 2023-01-13
Payer: COMMERCIAL

## 2023-01-13 PROCEDURE — 97110 THERAPEUTIC EXERCISES: CPT

## 2023-01-13 PROCEDURE — 97530 THERAPEUTIC ACTIVITIES: CPT

## 2023-01-13 PROCEDURE — 97116 GAIT TRAINING THERAPY: CPT

## 2023-01-13 PROCEDURE — 97112 NEUROMUSCULAR REEDUCATION: CPT

## 2023-01-13 NOTE — PROGRESS NOTES
PT DAILY TREATMENT NOTE     Patient Name: Nancy Ayala. Date:2023  : 2000  [x]  Patient  Verified  Payor: Elly Junior / Plan: Yessy Schneider HMO / Product Type: HMO /    In time: 10:00  Out time: 11:00  Total Treatment Time (min): 60  Visit #: 2 of 10    Medicare/[facility] Time Tracking (below)   Total Timed Codes (min):  n/a 1:1 Treatment Time:  n/a       Treatment Area: Left knee pain [M25.562]    SUBJECTIVE  Pain Level (0-10 scale): 0/10  Any medication changes, allergies to medications, adverse drug reactions, diagnosis change, or new procedure performed?: [x] No    [] Yes (see summary sheet for update)  Subjective functional status/changes:   [] No changes reported  I haven't been having any pain, just stiffness. \"    OBJECTIVE  Modality rationale: decrease pain to improve the patients ability to return to sport   Min Type Additional Details    [] Estim: []Att   []Unatt  []TENS instruct                 []IFC  []Premod []NMES                       []Other:  []w/US   []w/ice   []w/heat  Position:  Location:    []  Traction: [] Cervical       []Lumbar                       [] Prone          []Supine                       []Intermittent   []Continuous Lbs:  [] before manual  [] after manual    []  Ultrasound: []Continuous   [] Pulsed                           []1MHz   []3MHz Location:  W/cm2:    []  Iontophoresis with dexamethasone         Location: [] Take home patch   [] In clinic   10 [x]  Ice: anterior left knee  [x]  Heat: posterior left knee  []  Ice massage Position:  Location:    []  Vasopneumatic Device:  If using vaso (only need to measure limb vaso being performed on)      pre-treatment girth :       post-treatment girth :       measured at (landmark location)  Pressure: [] lo [] med [] hi   Temp: [] lo [] med [] Hi    Position:  Location:   [x] Skin assessment post-treatment:  [x]intact []redness- no adverse reaction       []redness - adverse reaction:       15 min Therapeutic Exercise:  [x] See flow sheet : core and glute exercises   Rationale: increase ROM and increase strength to improve the patients ability to return to sport     10 min Therapeutic Activity:  [x]  See flow sheet : squatting re-ed   Rationale: increase ROM, increase strength, improve coordination, improve balance, and increase proprioception to improve the patients ability to return to sport     10 min Neuromuscular Re-education:  [x]  See flow sheet : balance therex   Rationale: increase strength, improve coordination, improve balance, and increase proprioception  to improve the patients ability to return to sport     15 min Gait Training:  band walks, agility drills   Rationale: improve strength to prepare for return to sport          Billed With/As:   [x] TE   [] TA   [] Neuro   [] Self Care Patient Education: [x] Review HEP    [] Progressed/Changed HEP based on:   [] positioning   [] body mechanics   [] transfers   [] heat/ice application        Other Objective/Functional Measures:   SL HR 20x    Pain Level (0-10 scale) post treatment: 0/10    ASSESSMENT  Changes in Function: Focused on active warm up and core/hip activation to prepare a comprehensive pre-workout warm up. Patient will continue to benefit from skilled PT services to modify and progress therapeutic interventions, address functional mobility deficits, address ROM deficits, address strength deficits, analyze and address soft tissue restrictions, analyze and cue movement patterns, analyze and modify body mechanics/ergonomics, and assess and modify postural abnormalities to attain remaining goals. []  See Plan of Care  []  See progress note/recertification  []  See Discharge Summary         Progress towards goals / Updated goals:  1. Pt to report FOTO score of at least 82/100 pts to demonstrate improved function and quality of life. Status at last assessment: FOTO 59   2.  Pt to demo at least 20 unilateral heel raises on L LE for improved ankle strength and stability prior to initiating plyometrics. Status at last assessment: 20 x no UE assist (1/13/22)  3. Pt to demo SLS on foam EC on L LE of at least 30 sec for improved proprioception prior to initiating agility drills  Status at last assessment:15\"  4. Pt to demo unilateral squat on L to at least 90 deg without pain and with good mechanics for improved L LE functional strength. Status at last assessment: > 85 deg  5. Pt to demo at least 85% symmetry in single leg lateral hop testing to demo improved safety with return to sport.   Status at last assessment: right 26 reps, left 12 reps      PN Due 2/5/22    PLAN  [x]  Upgrade activities as tolerated     [x]  Continue plan of care  []  Update interventions per flow sheet       []  Discharge due to:_  []  Other:_      Ocsar Dunne, PT 1/13/2023  10:09 AM    Future Appointments   Date Time Provider Abbey King   1/19/2023  9:30 AM Erin Chirinos [facility]   1/20/2023 12:30 PM Fredrick Trinh PTA [facility]   1/26/2023 10:00 AM Fredrick Trinh PTA [facility]   1/27/2023 10:00 AM Christine Vaughn PT [facility]

## 2023-01-19 ENCOUNTER — HOSPITAL ENCOUNTER (OUTPATIENT)
Dept: PHYSICAL THERAPY | Age: 23
Discharge: HOME | End: 2023-01-19
Payer: COMMERCIAL

## 2023-01-19 PROCEDURE — 97112 NEUROMUSCULAR REEDUCATION: CPT

## 2023-01-19 PROCEDURE — 97110 THERAPEUTIC EXERCISES: CPT

## 2023-01-19 NOTE — PROGRESS NOTES
PT DAILY TREATMENT NOTE     Patient Name: Dudley Martin. Date:2023  : 2000  [x]  Patient  Verified  Payor: Bebo Gallardo / Plan: Lyssa Arceo HMO / Product Type: HMO /    In time:9:33  Out time:10:35  Total Treatment Time (min): 62  Visit #: 3 of 10    Medicare/BCBS Only   Total Timed Codes (min):   1:1 Treatment Time:         Treatment Area: Left knee pain [M25.562]    SUBJECTIVE  Pain Level (0-10 scale): 0  Any medication changes, allergies to medications, adverse drug reactions, diagnosis change, or new procedure performed?: [x] No    [] Yes (see summary sheet for update)  Subjective functional status/changes:   [] No changes reported  \"No pain ever really, just stiffness when I stand too long or sit too long. \"    OBJECTIVE    Modality rationale: decrease pain and increase tissue extensibility to improve the patients ability to relax and sleep following therapy session to improve restorative sleep patterns.     Min Type Additional Details    [x] Estim:  []Unatt       []IFC  []Premod                        []Other:  []w/ice   []w/heat  Position:   Location:     [] Estim: []Att    []TENS instruct  []NMES                    []Other:  []w/US   []w/ice   []w/heat  Position:  Location:    []  Traction: [] Cervical       []Lumbar                       [] Prone          []Supine                       []Intermittent   []Continuous Lbs:  [] before manual  [] after manual    []  Ultrasound: []Continuous   [] Pulsed                           []1MHz   []3MHz W/cm2:  Location:    []  Iontophoresis with dexamethasone         Location: [] Take home patch   [] In clinic   10 [x]  Ice     [x]  heat  []  Ice massage  []  Laser   []  Anodyne Position: MHP to post knee, CP to ant knee  Location: reclined    []  Laser with stim  []  Other:  Position:  Location:    []  Vasopneumatic Device    []  Right     []  Left  Pre-treatment girth:  Post-treatment girth:  Measured at (location):  Pressure:       [] lo [] med [] hi Temperature: [] lo [] med [] hi   [x] Skin assessment post-treatment:  [x]intact []redness- no adverse reaction    []redness - adverse reaction:       12 min Therapeutic Exercise:  [x] See flow sheet :   Rationale: increase ROM and increase strength to improve the patients ability to perform gait and transfers with improved LE strength and mobility. 40 min Neuromuscular Re-education:  [x]  See flow sheet :  Plyometrics-    Jump (DL) in place 3 x 10  DL Broad Jump 2 x 10  DL Line Jump FWD/RETRO and LAT 30\" ea   Dead Drops from 4\" 2 x 15  Lunge jumps 2 x 15  SL Hops 30\" x 2   Rationale: increase strength, improve coordination, improve balance and increase proprioception  to improve the patients ability to perform stair negotiation and functional mobility in the home/community with improved quadriceps control and decreased falls risk. With   [] TE   [] TA   [] neuro   [] other: Patient Education: [x] Review HEP    [] Progressed/Changed HEP based on:   [] positioning   [] body mechanics   [] transfers   [] heat/ice application    [] other:      Other Objective/Functional Measures: -Updated HEP to include additional plyometrics 2x/ week     Pain Level (0-10 scale) post treatment: 0    ASSESSMENT/Changes in Function: Patient tolerates all double leg and single leg plyometric activities today without increased pain, although he continues to require frequent verbal cuing during landing phase to encourage soft knee flexion upon initial contact. He demos improved symmetry in landing today compared to previous session. Continued to progress dynamic single leg hops in future sessions.     Patient will continue to benefit from skilled PT services to modify and progress therapeutic interventions, address functional mobility deficits, address ROM deficits, address strength deficits, analyze and address soft tissue restrictions, analyze and cue movement patterns, analyze and modify body mechanics/ergonomics, assess and modify postural abnormalities and address imbalance/dizziness to attain remaining goals. []  See Plan of Care  []  See progress note/recertification  []  See Discharge Summary         Progress towards goals / Updated goals:  1. Pt to report FOTO score of at least 82/100 pts to demonstrate improved function and quality of life. Status at last assessment: FOTO 59   2. Pt to demo at least 20 unilateral heel raises on L LE for improved ankle strength and stability prior to initiating plyometrics. Current:  20 x no UE assist (1/13/22)  3. Pt to demo SLS on foam EC on L LE of at least 30 sec for improved proprioception prior to initiating agility drills  Status at last assessment:15\"  4. Pt to demo unilateral squat on L to at least 90 deg without pain and with good mechanics for improved L LE functional strength. Status at last assessment: > 85 deg  5. Pt to demo at least 85% symmetry in single leg lateral hop testing to demo improved safety with return to sport.   Status at last assessment: right 26 reps, left 12 reps  Current:  addressing w/ double leg > single leg plyo training (1/19/23)      PN Due 2/5/22    PLAN  [x]  Upgrade activities as tolerated     [x]  Continue plan of care  []  Update interventions per flow sheet       []  Discharge due to:_  []  Other:_      Jonny Rodriguez 1/19/2023  7:25 AM    Future Appointments   Date Time Provider Abbey King   1/19/2023  9:30 AM Mari Isaac [facility]   1/20/2023 12:30 PM Keira Robison PTA [facility]   1/26/2023 10:00 AM Keira Robison PTA [facility]   1/27/2023 10:00 AM Troy Khanna PT [facility]

## 2023-01-20 ENCOUNTER — HOSPITAL ENCOUNTER (OUTPATIENT)
Dept: PHYSICAL THERAPY | Age: 23
Discharge: HOME | End: 2023-01-20
Payer: COMMERCIAL

## 2023-01-20 PROCEDURE — 97110 THERAPEUTIC EXERCISES: CPT

## 2023-01-20 PROCEDURE — 97112 NEUROMUSCULAR REEDUCATION: CPT

## 2023-01-20 NOTE — PROGRESS NOTES
PT DAILY TREATMENT NOTE     Patient Name: Rad Umana. Date:2023  : 2000  [x]  Patient  Verified  Payor: Yolanda Worley / Plan: Angy Pierce HMO / Product Type: HMO /    In time: 12:40 pm           Out time: 1:40 pm  Total Treatment Time (min): 60  Visit #: 4 of 10    Medicare/BCBS Only   Total Timed Codes (min):   1:1 Treatment Time:         Treatment Area: Left knee pain [M25.562]    SUBJECTIVE  Pain Level (0-10 scale): 0  Any medication changes, allergies to medications, adverse drug reactions, diagnosis change, or new procedure performed?: [x] No    [] Yes (see summary sheet for update)  Subjective functional status/changes:   [] No changes reported  \"I am getting there. \"    OBJECTIVE    Modality rationale: decrease pain and increase tissue extensibility to improve the patients ability to relax and sleep following therapy session to improve restorative sleep patterns. Min Type Additional Details    [] Estim:  []Unatt       []IFC  []Premod                      []w/heat  Position:   Location:     [] Estim: []Att     []NMES                    []Other:  []w/ice   []w/heat  Position:  Location:   10 [x]  Ice     [x]  Heat Position: MHP to post knee, CP to ant knee  Location: reclined    []  Vasopneumatic Device    []  Right     []  Left  Pre-treatment girth:  Post-treatment girth:  Measured at (location):  Pressure:       [] lo [] med [] hi   Temperature: [] lo [] med [] hi   [x] Skin assessment post-treatment:  [x]intact []redness- no adverse reaction    []redness - adverse reaction:       40 min Therapeutic Exercise:  [x] See flow sheet: soleus and gastroc eccentric training; step up knee drives (emphasis on eccentric return; pt holding one 10# weight in each hand)   Rationale: increase ROM and increase strength to improve the patients ability to perform gait and transfers with improved LE strength and mobility.     10 min Neuromuscular Re-education:  [x]  See flow sheet:  Plyometrics: held all except dead drops fom 6\" step today due to pain with lateral cone hops   Rationale: increase strength, improve coordination, improve balance and increase proprioception  to improve the patients ability to perform stair negotiation and functional mobility in the home/community with improved quadriceps control and decreased falls risk. With   [x] TE   [] TA   [x] neuro   [] other: Patient Education: [x] Review HEP    [] Progressed/Changed HEP based on:   [] positioning   [] body mechanics   [] transfers   [] heat/ice application    [] other:      Other Objective/Functional Measures:   Prisoner heel raise: patient at stairs in half-stance, one foot on second step of stairs; 10# weight held with both hands behind back of lower neck; patient single-leg heel raises on posterior LE directly up with minimal forward movement in the coronal plane, quick concentric and slow eccentric return    Pain Level (0-10 scale) post treatment: 0    ASSESSMENT/Changes in Function:   Emphasis on eccentric LE strengthening for stopping phase of running due to discomfort with plyometrics today, likely sec minimal rest time between session. Patient will continue to benefit from skilled PT services to modify and progress therapeutic interventions, address functional mobility deficits, address ROM deficits, address strength deficits, analyze and address soft tissue restrictions, analyze and cue movement patterns, analyze and modify body mechanics/ergonomics, assess and modify postural abnormalities and address imbalance/dizziness to attain remaining goals. []  See Plan of Care  []  See progress note/recertification  []  See Discharge Summary         Progress towards goals / Updated goals:  1. Pt to report FOTO score of at least 82/100 pts to demonstrate improved function and quality of life. Status at last assessment: FOTO 59   2.  Pt to demo at least 20 unilateral heel raises on L LE for improved ankle strength and stability prior to initiating plyometrics. Current:  20 x no UE assist (22)  3. Pt to demo SLS on foam EC on L LE of at least 30 sec for improved proprioception prior to initiating agility drills  Status at last assessment: 15\"  4. Pt to demo unilateral squat on L to at least 90 deg without pain and with good mechanics for improved L LE functional strength. Status at last assessment: > 85 deg  5. Pt to demo at least 85% symmetry in single leg lateral hop testing to demo improved safety with return to sport.   Status at last assessment: right 26 reps, left 12 reps  Current:  addressing w/ double leg > single leg plyo training (23)      Progress Note due 22    PLAN  [x]  Upgrade activities as tolerated     [x]  Continue plan of care  []  Update interventions per flow sheet       []  Discharge due to:_  []  Other:_      Haritha Kelley PTA 2023    Future Appointments   Date Time Provider Abbey King   2023 10:00 AM Cedrick Castro PTA [facility]   2023 10:00 AM Luisa Preston PT [facility]

## 2023-01-26 ENCOUNTER — HOSPITAL ENCOUNTER (OUTPATIENT)
Dept: PHYSICAL THERAPY | Age: 23
Discharge: HOME | End: 2023-01-26
Payer: COMMERCIAL

## 2023-01-26 PROCEDURE — 97110 THERAPEUTIC EXERCISES: CPT

## 2023-01-26 PROCEDURE — 97112 NEUROMUSCULAR REEDUCATION: CPT

## 2023-01-26 NOTE — PROGRESS NOTES
PT DAILY TREATMENT NOTE     Patient Name: Kanchan Ohara. Date:2023  : 2000  [x]  Patient  Verified  Payor: Edward Lerner / Plan: Sal Vargas HMO / Product Type: HMO /    In time: 10:04 am             Out time: 11:00 am  Total Treatment Time (min): 56  Visit #: 5 of 10    Medicare/BCBS Only   Total Timed Codes (min):   1:1 Treatment Time:         Treatment Area: Left knee pain [M25.562]    SUBJECTIVE  Pain Level (0-10 scale): 0  Any medication changes, allergies to medications, adverse drug reactions, diagnosis change, or new procedure performed?: [x] No    [] Yes (see summary sheet for update)  Subjective functional status/changes:   [] No changes reported  \"No pain ever really, just stiffness when I stand too long or sit too long. \"    OBJECTIVE    Modality rationale: decrease pain and increase tissue extensibility to improve the patients ability to relax and sleep following therapy session to improve restorative sleep patterns. Min Type Additional Details   10 [x]  Ice     [x]  Heat   Position: MHP to post knee, CP to ant knee  Location: reclined   [x] Skin assessment post-treatment:  [x]intact []redness- no adverse reaction    []redness - adverse reaction:       11 min Therapeutic Exercise:  [x] See flow sheet :   Rationale: increase ROM and increase strength to improve the patients ability to perform gait and transfers with improved LE strength and mobility.     35 min Neuromuscular Re-education:  [x]  See flow sheet :  Plyometrics-    [facility] drills, figure 8, 8 laps  DL Broad Jump 2 x 10  DL Line Jump LAT 30\" ea   Dead Drops from 6\" 3 x 10  Lunge jumps 2 x 15 using 4\" step (contralateral toe on step)  SL Hops 30\" x 2 (heels never touching ground)   Rationale: increase strength, improve coordination, improve balance and increase proprioception  to improve the patients ability to perform stair negotiation and functional mobility in the home/community with improved quadriceps control and decreased falls risk. With   [x] TE   [] TA   [x] neuro   [] other: Patient Education: [x] Review HEP    [] Progressed/Changed HEP based on:   [] positioning   [] body mechanics   [] transfers   [] heat/ice application    [] other:      Other Objective/Functional Measures:     Pain Level (0-10 scale) post treatment: 0    ASSESSMENT/Changes in Function:   Patient able to tolerate all double leg and single leg plyometrics without c/o pain, req min cueing for eccentric control during all triple flexion attempts for landing. Patient will continue to benefit from skilled PT services to modify and progress therapeutic interventions, address functional mobility deficits, address ROM deficits, address strength deficits, analyze and address soft tissue restrictions, analyze and cue movement patterns, analyze and modify body mechanics/ergonomics, assess and modify postural abnormalities and address imbalance/dizziness to attain remaining goals. []  See Plan of Care  []  See progress note/recertification  []  See Discharge Summary         Progress towards goals / Updated goals:  1. Pt to report FOTO score of at least 82/100 pts to demonstrate improved function and quality of life. Status at last assessment: FOTO 59   2. Pt to demo at least 20 unilateral heel raises on L LE for improved ankle strength and stability prior to initiating plyometrics. Current:  20 x no UE assist (1/13/22)  3. Pt to demo SLS on foam EC on L LE of at least 30 sec for improved proprioception prior to initiating agility drills  Status at last assessment: 15\"  4. Pt to demo unilateral squat on L to at least 90 deg without pain and with good mechanics for improved L LE functional strength. Status at last assessment: > 85 deg  5. Pt to demo at least 85% symmetry in single leg lateral hop testing to demo improved safety with return to sport.   Status at last assessment: right 26 reps, left 12 reps  Current:  addressing w/ double leg > single leg plyo training (1/19/23)      PN Due 2/5/22    PLAN  [x]  Upgrade activities as tolerated     [x]  Continue plan of care  []  Update interventions per flow sheet       []  Discharge due to:_  [x]  Other: patient scheduled for one more session, able to schedule until beginning of next month per most recent [address], PTA 1/26/2023    Future Appointments   Date Time Provider Abbey King   1/27/2023 10:00 AM Shireen Centeno PT [facility]

## 2023-01-27 ENCOUNTER — HOSPITAL ENCOUNTER (OUTPATIENT)
Dept: PHYSICAL THERAPY | Age: 23
Discharge: HOME | End: 2023-01-27
Payer: COMMERCIAL

## 2023-01-27 PROCEDURE — 97112 NEUROMUSCULAR REEDUCATION: CPT

## 2023-01-27 PROCEDURE — 97110 THERAPEUTIC EXERCISES: CPT

## 2023-01-27 PROCEDURE — 97116 GAIT TRAINING THERAPY: CPT

## 2023-01-27 NOTE — PROGRESS NOTES
PT DAILY TREATMENT NOTE     Patient Name: Violet Chavarria. Date:2023  : 2000  [x]  Patient  Verified  Payor: [employer] / Plan: Phylliss Dancer HMO / Product Type: HMO /    In time: 10:02 am             Out time: 11:00 am  Total Treatment Time (min): 58  Visit #: 6 of 10    Medicare/BCBS Only   Total Timed Codes (min):  n/a 1:1 Treatment Time:  n/a       Treatment Area: Left knee pain [M25.562]    SUBJECTIVE  Pain Level (0-10 scale): 0/10  Any medication changes, allergies to medications, adverse drug reactions, diagnosis change, or new procedure performed?: [x] No    [] Yes (see summary sheet for update)  Subjective functional status/changes:   [] No changes reported  \"I feel like I'm trusting the knee better. \"    OBJECTIVE    Modality rationale: decrease pain and increase tissue extensibility to improve the patients ability to relax and sleep following therapy session to improve restorative sleep patterns. Min Type Additional Details   10 [x]  Ice     [x]  Heat   Position: MHP to post knee, CP to ant knee  Location: reclined   [x] Skin assessment post-treatment:  [x]intact []redness- no adverse reaction    []redness - adverse reaction:       15 min Therapeutic Exercise:  [x] See flow sheet : LE strengthening   Rationale: increase ROM and increase strength to improve the patients ability to perform gait and transfers with improved LE strength and mobility. 25 min Neuromuscular Re-education:  [x]  See flow sheet : glute and core re-ed, plyometrics   Rationale: increase strength, improve coordination, improve balance and increase proprioception  to improve the patients ability to perform stair negotiation and functional mobility in the home/community with improved quadriceps control and decreased falls risk.      8 min Gait Training:  ladder agility drills   Rationale: improve strength to prepare for return to sport       With   [x] TE   [] TA   [x] neuro   [] other: Patient Education: [x] Review HEP    [] Progressed/Changed HEP based on:   [] positioning   [] body mechanics   [] transfers   [] heat/ice application    [] other:      Other Objective/Functional Measures: Improved SL weightbearing  tolerance and motor control noted with plyometrics and agility drills. Pain Level (0-10 scale) post treatment: 0/10    ASSESSMENT/Changes in Function:   Patient tolerating therex progression well without complaints of pain. Treatment focused on increasing load and impact on left knee to improve motor control. Patient will continue to benefit from skilled PT services to modify and progress therapeutic interventions, address functional mobility deficits, address ROM deficits, address strength deficits, analyze and address soft tissue restrictions, analyze and cue movement patterns, analyze and modify body mechanics/ergonomics, assess and modify postural abnormalities and address imbalance/dizziness to attain remaining goals. []  See Plan of Care  []  See progress note/recertification  []  See Discharge Summary         Progress towards goals / Updated goals:  1. Pt to report FOTO score of at least 82/100 pts to demonstrate improved function and quality of life. Status at last assessment: FOTO 59   2. Pt to demo at least 20 unilateral heel raises on L LE for improved ankle strength and stability prior to initiating plyometrics. Current:  20 x no UE assist (1/13/22)  3. Pt to demo SLS on foam EC on L LE of at least 30 sec for improved proprioception prior to initiating agility drills  Status at last assessment: 15 seconds (1/27/23)  4. Pt to demo unilateral squat on L to at least 90 deg without pain and with good mechanics for improved L LE functional strength. Status at last assessment: > 85 deg  5. Pt to demo at least 85% symmetry in single leg lateral hop testing to demo improved safety with return to sport.   Status at last assessment: right 26 reps, left 12 reps  Current:  addressing w/ double leg > single leg plyo training (1/19/23)      PN Due 2/4/23    PLAN  [x]  Upgrade activities as tolerated     [x]  Continue plan of care  []  Update interventions per flow sheet       []  Discharge due to:_  [x]  Other: patient scheduled for one more session, able to schedule until beginning of next month per most recent [address], PT 1/27/2023    No future appointments.

## 2023-02-10 ENCOUNTER — HOSPITAL ENCOUNTER (OUTPATIENT)
Dept: PHYSICAL THERAPY | Age: 23
End: 2023-02-10

## 2023-02-17 ENCOUNTER — HOSPITAL ENCOUNTER (OUTPATIENT)
Facility: HOSPITAL | Age: 23
Setting detail: RECURRING SERIES
Discharge: HOME | End: 2023-02-20
Payer: COMMERCIAL

## 2023-02-17 PROCEDURE — 97112 NEUROMUSCULAR REEDUCATION: CPT

## 2023-02-17 PROCEDURE — 97530 THERAPEUTIC ACTIVITIES: CPT

## 2023-02-17 PROCEDURE — 97110 THERAPEUTIC EXERCISES: CPT

## 2023-02-17 NOTE — PROGRESS NOTES
PHYSICAL / OCCUPATIONAL THERAPY - DAILY TREATMENT NOTE (updated )    Patient Name: Willa Gardner. Date: 2023    : 2000  Insurance: Payor: Lalita Backers / Plan: Lalita Backers / Product Type: *No Product type* /      Patient  verified yes     Visit #   Current / Total 7 10 Total Time   Time   In / Out 12:40 1:30 50   Pain   In / Out 0 0    Subjective Functional Status/Changes: I was sleeping better over the past week and that helped my knee feel better. Changes to:  Meds, Allergies, Med Hx, Sx Hx? If yes, update Summary List no      TREATMENT AREA =  Pain in left knee [M25.562]    OBJECTIVE    Therapeutic Procedures:  48  Total  [address] Reminder: bill using total billable min of TIMED therapeutic procedures (example: do not include dry needle or estim unattended, both untimed codes, in totals to left)  8-22 min = 1 unit; 23-37 min = 2 units; 38-52 min = 3 units; 53-67 min = 4 units; 68-82 min = 5 units   Tx Min  Procedure, Rationale, Specifics   12  76263 Therapeutic Exercise (timed):  increase ROM, strength, coordination, balance, and proprioception to improve patient's ability to progress to PLOF and address remaining functional goals. (see flow sheet as applicable)   Details if applicable:       25  95897 Therapeutic Activity (timed):  use of dynamic activities replicating functional movements to increase ROM, strength, coordination, balance, and proprioception in order to improve patient's ability to progress to PLOF and address remaining functional goals.   (see flow sheet as applicable)   Details if applicable:    Progress toward goals, HEP review, outcome measure testing   13  24622 Neuromuscular Re-Education (timed):  improve balance, coordination, kinesthetic sense, posture, core stability and proprioception to improve patient's ability to develop conscious control of individual muscles and awareness of position of extremities in order to progress to PLOF and address remaining functional goals. (see flow sheet as applicable)     Details if applicable:    Single leg dynamic balance interventions       [x]  Patient Education billed concurrently with other procedures   [x] Review HEP    [] Progressed/Changed HEP, detail:    [] Other detail:       Objective Information/Functional Measures/Assessment    Patient has attended PT for 13 sessions for the treatment of left knee pain. The patient demonstrates continued slow, steady progress at this time. He reports full pain resolution and decreased bouts of knee stiffness/heaviness. He continued to demonstrate increased left genu valgus with single leg squatting/stair descent and demonstrates impaired left knee dynamic strength/balance that limits his safety with return to PLOF sports/exercise classes to promote lifelong health. . Additional assessment includes:  Subjective Gains: resolution of knee pain, decreased frequency of knee heaviness, improved leg strength  Subjective Deficits: unable to return to high level sports at this time, finding trouble reinitiating independent program (especially with work schedule)  Pain   Average: 0/10  Patient Goal: \"get established\"  Objective Measures:     LE Strength:    Right (/5) Left (/5)   Hip     Flexion 5 5             Abduction 5 5             Extension 5 5   Knee   Extension 5 5              Flexion 5 4+   Ankle   Dorsiflexion 5 5               PF 25 (reps) 20 (reps)      Stair Negotiation: reciprocal  Balance:   mCTSIB in SLS 30\" all conditions except EC foam at 23\"  30\" Lateral Hop Right 46 repetitions, Left 36 repetitions  30\" FWD Hop Right 56 reps, left 36 reps  SL Sit<>Stand right 11 deg, left 9 deg    SL Forward Hop Right 75\", 65\" Left    Patient will continue to benefit from skilled PT / OT services to modify and progress therapeutic interventions, analyze and address functional mobility deficits, analyze and address ROM deficits, analyze and address strength deficits, analyze and address soft tissue restrictions, analyze and cue for proper movement patterns, analyze and modify for postural abnormalities, analyze and address imbalance/dizziness, and instruct in home and community integration to address functional deficits and attain remaining goals. Progress toward goals / Updated goals:  []  See Progress Note/Recertification    1. Pt to report FOTO score of at least 82/100 pts to demonstrate improved function and quality of life. Status at last assessment: FOTO 59   Current: progressing, FOTO 75 (2/17/23)  2. Pt to demo at least 20 unilateral heel raises on L LE for improved ankle strength and stability prior to initiating plyometrics. Current:  20 x no UE assist (1/13/22)  3. Pt to demo SLS on foam EC on L LE of at least 30 sec for improved proprioception prior to initiating agility drills  Status at last assessment: 15 seconds (1/27/23)  Current: progressing, left 23\" (2/17/23)  4. Pt to demo unilateral squat on L to at least 90 deg without pain and with good mechanics for improved L LE functional strength. Status at last assessment: > 85 deg  Current: not met, pt cont to demo left genu valgus with single leg squat and step downs (2/17/23)  5. Pt to demo at least 85% symmetry in single leg lateral hop testing to demo improved safety with return to sport.   Status at last assessment: right 26 reps, left 12 reps  Current: progressing, lateral hop 78% (right 46, left 36) , forward 64% (right 56, left 36) forward hop for distance 87% (right 75\", left 65\")  PN Due 2/4/23    PLAN  [x] Continue plan of care  [x]  Upgrade activities as tolerated  []  Discharge due to :  []  Other:    Hallie Abel PT    2/17/2023    7:58 AM    Future Appointments   Date Time Provider Sanjiv Butcher   2/17/2023 12:30 PM Hallie Abel PT [facility]   2/23/2023 12[facility]30 PM [facility]   2/24/2023 10:00 AM EUNICE Rossi [facility]   3/2/2023 12:00 PM [facility] PT [facility]   3/3/2023 12:30 PM Monica Elissa Dancer [facility]   3/9/2023 12:00 PM Eric Dakins, PT [facility]   3/10/2023 12:30 PM Gerry Schilder, PTA [facility]   3/16/2023 10:00 AM Gerry Schilder, PTA [facility]   3/17/2023 10:00 AM Gerry Schilder, PTA [facility]

## 2023-02-20 NOTE — PROGRESS NOTES
[facility] [place]   [address] [place] [address]  Phone: [phone] Fax: [phone]  PROGRESS NOTE  Patient Name: Vance Collazo Jr. : 2000   Treatment/Medical Diagnosis: Pain in left knee [M25.562]   Referral Source: Sánchez Rodriguez MD     Date of Initial Visit: 22 Attended Visits: 13 Missed Visits: 1     SUMMARY OF TREATMENT  Pt is a 22 year old male presenting with right knee pain. Treatment has consisted of: therapeutic exercise to improve LE/lumbar strength/mobility; therapeutic activities to improve transfer/lift/carry ability; neuromuscular re-education to improve balance, core stability, neuromuscular control with lifting; physical agent/modality for symptom management; manual therapy for symptom relief and muscle flexibility; patient education to improve symptom management; self Care training; home safety training; stair training, and functional mobility training.      CURRENT STATUS  Patient has attended PT for 13 sessions for the treatment of left knee pain.  The patient demonstrates continued slow, steady progress at this time. He reports full pain resolution and decreased bouts of knee stiffness/heaviness. He continued to demonstrate increased left genu valgus with single leg squatting/stair descent and demonstrates impaired left knee dynamic strength/balance that limits his safety with return to PLOF sports/exercise classes to promote lifelong health.. Additional assessment includes:  Subjective Gains: resolution of knee pain, decreased frequency of knee heaviness, improved leg strength  Subjective Deficits: unable to return to high level sports at this time, finding trouble reinitiating independent program (especially with work schedule)  Pain   Average: 0/10  Patient Goal: \"get established\"  Objective Measures:      LE Strength:    Right (/5) Left (/5)   Hip     Flexion 5 5             Abduction 5 5              Extension 5 5   Knee   Extension 5 5              Flexion 5 4+   Ankle   Dorsiflexion 5 5               PF 25 (reps) 20 (reps)      Stair Negotiation: reciprocal  Balance:   mCTSIB in SLS 30\" all conditions except EC foam at 23\"  30\" Lateral Hop Right 46 repetitions, Left 36 repetitions  30\" FWD Hop Right 56 reps, left 36 reps  SL Sit<>Stand right 11 deg, left 9 deg     SL Forward Hop Right 75\", 65\" Left    Goals:  1. Pt to report FOTO score of at least 82/100 pts to demonstrate improved function and quality of life. Status at last assessment: FOTO 59   Current: progressing, FOTO 75 (2/17/23)  2. Pt to demo at least 20 unilateral heel raises on L LE for improved ankle strength and stability prior to initiating plyometrics. Current:  20 x no UE assist (1/13/22)  3. Pt to demo SLS on foam EC on L LE of at least 30 sec for improved proprioception prior to initiating agility drills  Status at last assessment: 15 seconds (1/27/23)  Current: progressing, left 23\" (2/17/23)  4. Pt to demo unilateral squat on L to at least 90 deg without pain and with good mechanics for improved L LE functional strength. Status at last assessment: > 85 deg  Current: not met, pt cont to demo left genu valgus with single leg squat and step downs (2/17/23)  5. Pt to demo at least 85% symmetry in single leg lateral hop testing to demo improved safety with return to sport. Status at last assessment: right 26 reps, left 12 reps  Current: progressing, lateral hop 78% (right 46, left 36) , forward 64% (right 56, left 36) forward hop for distance 87% (right 75\", left 65\")      New Goals to be achieved in __8__  treatments:  1. Pt to report FOTO score of at least 82/100 pts to demonstrate improved function and quality of life. Status at last assessment: FOTO 75   2. Pt to demo SLS on foam EC on L LE of at least 30 sec for improved proprioception prior to initiating agility drills  Status at last assessment:  left 23\"  3.  Pt to demo unilateral squat on L to at least 90 deg without pain and with good mechanics for improved L LE functional strength. Status at last assessment: > 85 deg, pt cont to demo left genu valgus with single leg squat and step downs (2/17/23)  4. Pt to demo at least 85% symmetry in single leg lateral hop testing to demo improved safety with return to sport. Status at last assessment: lateral hop 78% (right 46, left 36) , forward 64% (right 56, left 36) forward hop for distance 87% (right 75\", left 65\")    RECOMMENDATIONS  Pt to continue skilled therapy 1-2 sessions per week for 8 sessions to improve ability to return to [facility] athletic endeavors with decreased risk for re-injury and improved lifting tolerance for household chores. If you have any questions/comments please contact us directly at [phone]   Thank you for allowing us to assist in the care of your patient.         Therapist Signature: April Place, PT Date: 2/20/2023   Reporting Period  1/6/23-2/17/23 Time: 8:56 AM

## 2023-02-23 ENCOUNTER — HOSPITAL ENCOUNTER (OUTPATIENT)
Facility: HOSPITAL | Age: 23
Setting detail: RECURRING SERIES
Discharge: HOME | End: 2023-02-26
Payer: COMMERCIAL

## 2023-02-23 PROCEDURE — 97110 THERAPEUTIC EXERCISES: CPT

## 2023-02-23 PROCEDURE — 97112 NEUROMUSCULAR REEDUCATION: CPT

## 2023-02-23 PROCEDURE — 97530 THERAPEUTIC ACTIVITIES: CPT

## 2023-02-23 NOTE — PROGRESS NOTES
PHYSICAL / OCCUPATIONAL THERAPY - DAILY TREATMENT NOTE (updated )    Patient Name: Spenser Sue. Date: 2023    : 2000  Insurance: Payor: Tatyana Dunaway / Plan: Tatyana Dunaway / Product Type: *No Product type* /      Patient  verified yes     Visit #   Current / Total 1 8 Total Time   Time   In / Out 12:36 1:26 50   Pain   In / Out 0 0    Subjective Functional Status/Changes: I was able to get in a good exercise routine this past Tuesday, it felt good to get the exercises done, I just need to get them done regularly. Changes to:  Meds, Allergies, Med Hx, Sx Hx? If yes, update Summary List no      TREATMENT AREA =  Pain in left knee [M25.562]    OBJECTIVE        Therapeutic Procedures:  48  Total  [address] Reminder: bill using total billable min of TIMED therapeutic procedures (example: do not include dry needle or estim unattended, both untimed codes, in totals to left)  8-22 min = 1 unit; 23-37 min = 2 units; 38-52 min = 3 units; 53-67 min = 4 units; 68-82 min = 5 units   Tx Min  Procedure, Rationale, Specifics   12  56369 Therapeutic Exercise (timed):  increase ROM, strength, coordination, balance, and proprioception to improve patient's ability to progress to PLOF and address remaining functional goals. (see flow sheet as applicable)   Details if applicable:       12  24124 Therapeutic Activity (timed):  use of dynamic activities replicating functional movements to increase ROM, strength, coordination, balance, and proprioception in order to improve patient's ability to progress to PLOF and address remaining functional goals.   (see flow sheet as applicable)   Details if applicable:       26  01097 Neuromuscular Re-Education (timed):  improve balance, coordination, kinesthetic sense, posture, core stability and proprioception to improve patient's ability to develop conscious control of individual muscles and awareness of position of extremities in order to progress to PLOF and address remaining functional goals. (see flow sheet as applicable)     Details if applicable:  Plyometrics  Forward broad jumps for distance  SL Hop FWD/BACK and LAT for time  SL Hop FWD to explosive jump in place           [x]  Patient Education billed concurrently with other procedures   [x] Review HEP    [] Progressed/Changed HEP, detail:    [] Other detail:       Objective Information/Functional Measures/Assessment    Patient initially demos poor symmetry in double leg plyometrics, tending to perform most of explosive movement with right LE. He demo improved symmetry following right SL plyos and rockerboard squats with emphasis on medial/lateral stability. [place] squats in tall plank useful for TKE stability. Patient will continue to benefit from skilled PT / OT services to modify and progress therapeutic interventions, analyze and address functional mobility deficits, analyze and address ROM deficits, analyze and address strength deficits, analyze and address soft tissue restrictions, analyze and cue for proper movement patterns, analyze and modify for postural abnormalities, analyze and address imbalance/dizziness, and instruct in home and community integration to address functional deficits and attain remaining goals. Progress toward goals / Updated goals:  []  See Progress Note/Recertification    1. Pt to report FOTO score of at least 82/100 pts to demonstrate improved function and quality of life. Status at last assessment: FOTO 75   2. Pt to demo SLS on foam EC on L LE of at least 30 sec for improved proprioception prior to initiating agility drills  Status at last assessment:  left 23\"  3. Pt to demo unilateral squat on L to at least 90 deg without pain and with good mechanics for improved L LE functional strength. Status at last assessment: > 85 deg, pt cont to demo left genu valgus with single leg squat and step downs (2/17/23)  4.  Pt to demo at least 85% symmetry in single leg lateral hop testing to demo improved safety with return to sport.   Status at last assessment: lateral hop 78% (right 46, left 36) , forward 64% (right 56, left 36) forward hop for distance 87% (right 75\", left 65\")  Current: addressing with plyometrics (2/23/23)    PLAN  [x] Continue plan of care  [x]  Upgrade activities as tolerated  []  Discharge due to :  []  Other:    Nato Meraz, PT    2/23/2023    8:02 AM    Future Appointments   Date Time Provider Sanjiv Butcher   2/23/2023 12:30 PM Nato Meraz, PT [facility]   2/24/2023 10:00 AM Nato Meraz, PT [facility]   3/2/2023 12:00 PM [facility] PT ABIGAILENT 2 [facility]   3/3/2023 12:30 PM Boris Peter, PT [facility]   3/9/2023 12:00 PM Nato Meraz, PT [facility]   3/10/2023 12:30 PM Mildred Beaulieu, PTA [facility]   3/16/2023 10:00 AM Mildred Beaulieu, PTA [facility]   3/17/2023 10:00 AM Mildred Beaulieu, PTA [facility]

## 2023-02-24 ENCOUNTER — APPOINTMENT (OUTPATIENT)
Facility: HOSPITAL | Age: 23
End: 2023-02-24
Payer: COMMERCIAL

## 2023-03-02 ENCOUNTER — HOSPITAL ENCOUNTER (OUTPATIENT)
Facility: HOSPITAL | Age: 23
Setting detail: RECURRING SERIES
Discharge: HOME | End: 2023-03-05
Payer: COMMERCIAL

## 2023-03-02 PROCEDURE — 97112 NEUROMUSCULAR REEDUCATION: CPT | Performed by: PHYSICAL THERAPIST

## 2023-03-02 PROCEDURE — 97530 THERAPEUTIC ACTIVITIES: CPT | Performed by: PHYSICAL THERAPIST

## 2023-03-02 PROCEDURE — 97110 THERAPEUTIC EXERCISES: CPT | Performed by: PHYSICAL THERAPIST

## 2023-03-02 NOTE — PROGRESS NOTES
PHYSICAL / OCCUPATIONAL THERAPY - DAILY TREATMENT NOTE (updated )    Patient Name: Malu Blake. Date: 3/2/2023    : 2000  Insurance: Payor: Estela Zepeda / Plan: Estela Zepeda / Product Type: *No Product type* /      Patient  verified yes     Visit #   Current / Total 2 8 Total Time   Time   In / Out 1205pm 1252pm 47   Pain   In / Out 0 0    Subjective Functional Status/Changes: Same ole same ole, I want to start jogging again soon. No pain just more stiffness. Changes to:  Meds, Allergies, Med Hx, Sx Hx? If yes, update Summary List no      TREATMENT AREA =  Pain in left knee [M25.562]    OBJECTIVE      Therapeutic Procedures:  52  Total  [address] Reminder: bill using total billable min of TIMED therapeutic procedures (example: do not include dry needle or estim unattended, both untimed codes, in totals to left)  8-22 min = 1 unit; 23-37 min = 2 units; 38-52 min = 3 units; 53-67 min = 4 units; 68-82 min = 5 units   Tx Min  Procedure, Rationale, Specifics   12  60724 Therapeutic Exercise (timed):  increase ROM, strength, coordination, balance, and proprioception to improve patient's ability to progress to PLOF and address remaining functional goals. (see flow sheet as applicable)   Details if applicable:       12  73486 Therapeutic Activity (timed):  use of dynamic activities replicating functional movements to increase ROM, strength, coordination, balance, and proprioception in order to improve patient's ability to progress to PLOF and address remaining functional goals. (see flow sheet as applicable)   Details if applicable:       23  79699 Neuromuscular Re-Education (timed):  improve balance, coordination, kinesthetic sense, posture, core stability and proprioception to improve patient's ability to develop conscious control of individual muscles and awareness of position of extremities in order to progress to PLOF and address remaining functional goals.  (see flow sheet as applicable)     Details if applicable:  Plyometrics  Forward broad jumps for distance  SL Hop FWD/BACK and LAT for time  SL Hop FWD to explosive jump in place  High skipping           [x]  Patient Education billed concurrently with other procedures   [x] Review HEP    [] Progressed/Changed HEP, detail:    [] Other detail:       Objective Information/Functional Measures/Assessment    Pt challenged with SL sit to stand eccentric lower with mild instability noted in L knee. Good SL stability with fwd / bkwd hops . Performed DL to explosive SL activities. Mild fasciculations with lateral step downs due to fatigue as performed at end of session. Added [place] squat with Blk tb with upright posture(trunk) and TKEs. Overall good med/lateral stability noted with plyometrics and Sl activities today. Progress patient toward walk to run program.     Patient will continue to benefit from skilled PT / OT services to modify and progress therapeutic interventions, analyze and address functional mobility deficits, analyze and address ROM deficits, analyze and address strength deficits, analyze and address soft tissue restrictions, analyze and cue for proper movement patterns, analyze and modify for postural abnormalities, analyze and address imbalance/dizziness, and instruct in home and community integration to address functional deficits and attain remaining goals. Progress toward goals / Updated goals:  []  See Progress Note/Recertification    1. Pt to report FOTO score of at least 82/100 pts to demonstrate improved function and quality of life. Status at last assessment: FOTO 75   2. Pt to demo SLS on foam EC on L LE of at least 30 sec for improved proprioception prior to initiating agility drills  Status at last assessment:  left 23\"  3. Pt to demo unilateral squat on L to at least 90 deg without pain and with good mechanics for improved L LE functional strength.   Status at last assessment: > 85 deg, pt cont to demo left genu valgus with single leg squat and step downs (2/17/23) good form noted with SL tap down from 6in step 3/2/23  4. Pt to demo at least 85% symmetry in single leg lateral hop testing to demo improved safety with return to sport.   Status at last assessment: lateral hop 78% (right 46, left 36) , forward 64% (right 56, left 36) forward hop for distance 87% (right 75\", left 65\")  Current: addressing with plyometrics (2/23/23)    PLAN  [x] Continue plan of care  [x]  Upgrade activities as tolerated  []  Discharge due to :  []  Other:    Dariela Carrero, PT    3/2/2023    11:15 AM    Future Appointments   Date Time Provider Sanjiv Butcher   3/2/2023 12:00 PM [address] [place] [address] 2 [facility]   3/3/2023 12:30 PM Sophia Glover, PT [facility]   3/9/2023 12:00 PM Saúl Perez, PT [facility]   3/10/2023 12:30 PM Kathleen Rose, PTA [facility]   3/16/2023 10:00 AM Kathleen Rose, PTA [facility]   3/17/2023 10:00 AM Kathleen Rose, PTA [facility]

## 2023-03-03 ENCOUNTER — HOSPITAL ENCOUNTER (OUTPATIENT)
Facility: HOSPITAL | Age: 23
Setting detail: RECURRING SERIES
Discharge: HOME | End: 2023-03-06
Payer: COMMERCIAL

## 2023-03-03 PROCEDURE — 97112 NEUROMUSCULAR REEDUCATION: CPT

## 2023-03-03 PROCEDURE — 97110 THERAPEUTIC EXERCISES: CPT

## 2023-03-03 PROCEDURE — 97530 THERAPEUTIC ACTIVITIES: CPT

## 2023-03-03 NOTE — PROGRESS NOTES
PHYSICAL / OCCUPATIONAL THERAPY - DAILY TREATMENT NOTE (updated )    Patient Name: Cheyenne Toledo. Date: 3/3/2023    : 2000  Insurance: Payor: Hernan Echols / Plan: Hernan Echols / Product Type: *No Product type* /      Patient  verified yes     Visit #   Current / Total 3 8   Time   In / Out 12:35 1:15   Pain   In / Out 0 0   Subjective Functional Status/Changes: \"I still have a hard time with planks. \"   Changes to:  Meds, Allergies, Med Hx, Sx Hx? If yes, update Summary List no      TREATMENT AREA =  Pain in left knee [M25.562]    OBJECTIVE      Therapeutic Procedures:  36  [facility] Totals Reminder: bill using total billable min of TIMED therapeutic procedures (example: do not include dry needle or estim unattended, both untimed codes, in totals to left)  8-22 min = 1 unit; 23-37 min = 2 units; 38-52 min = 3 units; 53-67 min = 4 units; 68-82 min = 5 units   Tx Min  Procedure, Rationale, Specifics   12  38689 Therapeutic Exercise (timed):  increase ROM, strength, coordination, balance, and proprioception to improve patient's ability to progress to PLOF and address remaining functional goals. (see flow sheet as applicable)     Details if applicable:  core and LE strengthening     12  93257 Therapeutic Activity (timed):  use of dynamic activities replicating functional movements to increase ROM, strength, coordination, balance, and proprioception in order to improve patient's ability to progress to PLOF and address remaining functional goals. (see flow sheet as applicable)     Details if applicable:  squatting     16  26636 Neuromuscular Re-Education (timed):  improve balance, coordination, kinesthetic sense, posture, core stability and proprioception to improve patient's ability to develop conscious control of individual muscles and awareness of position of extremities in order to progress to PLOF and address remaining functional goals.  (see flow sheet as applicable)     Details if applicable: plyometrics, high skipping, Lateral Shuffle; glute re-ed           [x]  Patient Education billed concurrently with other procedures   [x] Review HEP    [] Progressed/Changed HEP, detail:    [] Other detail:       Objective Information/Functional Measures/Assessment    Continuing to progress higher impact therex with focus on proprioceptive stability. Patient will continue to benefit from skilled PT / OT services to modify and progress therapeutic interventions, analyze and address functional mobility deficits, analyze and address ROM deficits, analyze and address strength deficits, analyze and address soft tissue restrictions, analyze and cue for proper movement patterns, analyze and modify for postural abnormalities, analyze and address imbalance/dizziness, and instruct in home and community integration to address functional deficits and attain remaining goals. Progress toward goals / Updated goals:  []  See Progress Note/Recertification    1. Pt to report FOTO score of at least 82/100 pts to demonstrate improved function and quality of life. Status at last assessment: FOTO 75   2. Pt to demo SLS on foam EC on L LE of at least 30 sec for improved proprioception prior to initiating agility drills  Status at last assessment:  left 23\"  3. Pt to demo unilateral squat on L to at least 90 deg without pain and with good mechanics for improved L LE functional strength. Status at last assessment: > 85 deg, pt cont to demo left genu valgus with single leg squat and step downs (2/17/23) good form noted with SL tap down from 6in step 3/2/23  4. Pt to demo at least 85% symmetry in single leg lateral hop testing to demo improved safety with return to sport.   Status at last assessment: lateral hop 78% (right 46, left 36) , forward 64% (right 56, left 36) forward hop for distance 87% (right 75\", left 65\")  Current: addressing with plyometrics (3/3/23)    PLAN  [x] Continue plan of care  [x]  Upgrade activities as tolerated  []  Discharge due to :  []  Other:    Ye Fischer, PT    3/3/2023    1:18 PM    Future Appointments   Date Time Provider Sanjiv Butcher   3/9/2023 12:00 PM Fam Mcclure, PT [facility]   3/10/2023 12:30 PM Erich Gee, ARLENE [facility]   3/16/2023 10:00 AM Erich Gee, PTA [facility]   3/17/2023 10:00 AM Erich Gee, PTA [facility]

## 2023-03-09 ENCOUNTER — HOSPITAL ENCOUNTER (OUTPATIENT)
Facility: HOSPITAL | Age: 23
Setting detail: RECURRING SERIES
End: 2023-03-09
Payer: COMMERCIAL

## 2023-03-10 ENCOUNTER — APPOINTMENT (OUTPATIENT)
Facility: HOSPITAL | Age: 23
End: 2023-03-10
Payer: COMMERCIAL

## 2023-03-16 ENCOUNTER — HOSPITAL ENCOUNTER (OUTPATIENT)
Facility: HOSPITAL | Age: 23
Setting detail: RECURRING SERIES
Discharge: HOME | End: 2023-03-19
Payer: COMMERCIAL

## 2023-03-16 PROCEDURE — 97530 THERAPEUTIC ACTIVITIES: CPT

## 2023-03-16 PROCEDURE — 97112 NEUROMUSCULAR REEDUCATION: CPT

## 2023-03-16 PROCEDURE — 97110 THERAPEUTIC EXERCISES: CPT

## 2023-03-16 NOTE — PROGRESS NOTES
therapeutic procedures (example: do not include dry needle or estim unattended, both untimed codes, in totals to left)  8-22 min = 1 unit; 23-37 min = 2 units; 38-52 min = 3 units; 53-67 min = 4 units; 68-82 min = 5 units   Tx Min  Procedure, Rationale, Specifics   8  32361 Therapeutic Exercise (timed):  increase ROM, strength, coordination, balance, and proprioception to improve patient's ability to progress to PLOF and address remaining functional goals. (see flow sheet as applicable)     Details if applicable:  core and LE strengthening     12  41211 Therapeutic Activity (timed):  use of dynamic activities replicating functional movements to increase ROM, strength, coordination, balance, and proprioception in order to improve patient's ability to progress to PLOF and address remaining functional goals. (see flow sheet as applicable)     Details if applicable:  running analysis     12  58299 Neuromuscular Re-Education (timed):  improve balance, coordination, kinesthetic sense, posture, core stability and proprioception to improve patient's ability to develop conscious control of individual muscles and awareness of position of extremities in order to progress to PLOF and address remaining functional goals.  (see flow sheet as applicable)     Details if applicable:            [x]  Patient Education billed concurrently with other procedures   [x] Review HEP    [x] Progressed/Changed HEP, detail: calf hops (eccentric landing to improve weight absorption) DL and SL 30 reps each, interval training this coming weekend  [] Other detail:       Objective Information/Functional Measures/Assessment  Running analysis: good form, mild ankle eversion / arch collapse (B)    Brisk walk at 3.5 mph for 30 seconds,   Jog at 6.0 mph for 2 minutes,   Cool down for 1.5 minutes at 3.5mph,   Jog at 6.0 mph for 30 seconds,   Push to 6.5 mph for 1 minute,   Push to 7.5 mph for 30 seconds    Patient with great response to introduction of

## 2023-03-17 ENCOUNTER — HOSPITAL ENCOUNTER (OUTPATIENT)
Facility: HOSPITAL | Age: 23
Setting detail: RECURRING SERIES
Discharge: HOME | End: 2023-03-20
Payer: COMMERCIAL

## 2023-03-17 PROCEDURE — 97110 THERAPEUTIC EXERCISES: CPT

## 2023-03-17 PROCEDURE — 97530 THERAPEUTIC ACTIVITIES: CPT

## 2023-03-17 PROCEDURE — 97112 NEUROMUSCULAR REEDUCATION: CPT

## 2023-03-17 NOTE — PROGRESS NOTES
total billable min of TIMED therapeutic procedures (example: do not include dry needle or estim unattended, both untimed codes, in totals to left)  8-22 min = 1 unit; 23-37 min = 2 units; 38-52 min = 3 units; 53-67 min = 4 units; 68-82 min = 5 units   Tx Min  Procedure, Rationale, Specifics   20  20616 Therapeutic Exercise (timed):  increase ROM, strength, coordination, balance, and proprioception to improve patient's ability to progress to PLOF and address remaining functional goals. (see flow sheet as applicable)     Details if applicable:  core and LE strengthening     15  71468 Therapeutic Activity (timed):  use of dynamic activities replicating functional movements to increase ROM, strength, coordination, balance, and proprioception in order to improve patient's ability to progress to PLOF and address remaining functional goals. (see flow sheet as applicable)     Details if applicable:  running analysis     10  89073 Neuromuscular Re-Education (timed):  improve balance, coordination, kinesthetic sense, posture, core stability and proprioception to improve patient's ability to develop conscious control of individual muscles and awareness of position of extremities in order to progress to PLOF and address remaining functional goals. (see flow sheet as applicable)     Details if applicable:            [x]  Patient Education billed concurrently with other procedures   [x] Review HEP    [x] Progressed/Changed HEP, detail: calf hops (eccentric landing to improve weight absorption) DL and SL 30 reps each, interval training this coming weekend  [] Other detail:       Objective Information/Functional Measures/Assessment  See PN.       Patient will continue to benefit from skilled PT / OT services to modify and progress therapeutic interventions, analyze and address functional mobility deficits, analyze and address ROM deficits, analyze and address strength deficits, analyze and address soft tissue restrictions, analyze
4:16 PM

## 2023-03-23 ENCOUNTER — HOSPITAL ENCOUNTER (OUTPATIENT)
Facility: HOSPITAL | Age: 23
Setting detail: RECURRING SERIES
Discharge: HOME | End: 2023-03-26
Payer: COMMERCIAL

## 2023-03-23 PROCEDURE — 97112 NEUROMUSCULAR REEDUCATION: CPT

## 2023-03-23 PROCEDURE — 97530 THERAPEUTIC ACTIVITIES: CPT

## 2023-03-23 PROCEDURE — 97110 THERAPEUTIC EXERCISES: CPT

## 2023-03-23 NOTE — PROGRESS NOTES
onset at anterolateral TCJ with WB, possibly referred pain, however may benefit from DTM/IASTM to tib ant and fibularis. Patient will continue to benefit from skilled PT / OT services to modify and progress therapeutic interventions, analyze and address functional mobility deficits, analyze and address ROM deficits, analyze and address strength deficits, analyze and address soft tissue restrictions, analyze and cue for proper movement patterns, analyze and modify for postural abnormalities, analyze and address imbalance/dizziness, and instruct in home and community integration to address functional deficits and attain remaining goals. Progress toward goals / Updated goals:  [x]  See Progress Note/Recertification  Patient will be (I) with finalized HEP in preparation for D/C to self-management of symptoms. Patient to report FOTO score of at least 82/100 pts to demonstrate improved function and quality of life.        PLAN  [x] Continue plan of care  [x]  Upgrade activities as tolerated  []  Discharge due to :  [x]  Other:     Frankie Adrian, PT    3/23/2023        Future Appointments   Date Time Provider Sanjiv Butcher   3/23/2023  4:00 PM Frankie Adrian PT [facility]   3/24/2023  9:30 AM Rani Heller PTA [facility]   3/30/2023  9:30 AM Frankie Adrian PT [facility]   3/31/2023  9:30 AM ARLENE Cazares [facility]

## 2023-03-24 ENCOUNTER — HOSPITAL ENCOUNTER (OUTPATIENT)
Facility: HOSPITAL | Age: 23
Setting detail: RECURRING SERIES
Discharge: HOME | End: 2023-03-27
Payer: COMMERCIAL

## 2023-03-24 PROCEDURE — 97112 NEUROMUSCULAR REEDUCATION: CPT

## 2023-03-24 PROCEDURE — 97110 THERAPEUTIC EXERCISES: CPT

## 2023-03-24 PROCEDURE — 97530 THERAPEUTIC ACTIVITIES: CPT

## 2023-03-24 NOTE — PROGRESS NOTES
analyze and address functional mobility deficits, analyze and address ROM deficits, analyze and address strength deficits, analyze and address soft tissue restrictions, analyze and cue for proper movement patterns, analyze and modify for postural abnormalities, analyze and address imbalance/dizziness, and instruct in home and community integration to address functional deficits and attain remaining goals. Progress toward goals / Updated goals:  [x]  See Progress Note/Recertification  Patient will be (I) with finalized HEP in preparation for D/C to self-management of symptoms. Patient to report FOTO score of at least 82/100 pts to demonstrate improved function and quality of life.        PLAN  [x] Continue plan of care  [x]  Upgrade activities as tolerated  []  Discharge due to :  [x]  Other:     Tiara Merchant PTA    3/24/2023        Future Appointments   Date Time Provider Sanjiv Butcher   3/30/2023  9:30 AM Soha Luna PT [facility]   3/31/2023  9:30 AM Tiara Merchant PTA [facility]

## 2023-03-30 ENCOUNTER — HOSPITAL ENCOUNTER (OUTPATIENT)
Facility: HOSPITAL | Age: 23
Setting detail: RECURRING SERIES
End: 2023-03-30
Payer: COMMERCIAL

## 2023-03-30 PROCEDURE — 97110 THERAPEUTIC EXERCISES: CPT

## 2023-03-30 PROCEDURE — 97112 NEUROMUSCULAR REEDUCATION: CPT

## 2023-03-30 PROCEDURE — 97530 THERAPEUTIC ACTIVITIES: CPT

## 2023-03-30 NOTE — PROGRESS NOTES
PHYSICAL / OCCUPATIONAL THERAPY - DAILY TREATMENT NOTE (updated )    Patient Name: Yanna Keita. Date: 3/30/2023    : 2000  Insurance: Payor: Nadege Palominop / Plan: Aleda Romp / Product Type: *No Product type* /      Patient  verified yes     Visit #   Current / Total 3 4   Time   In / Out 9:36 am 10:20 am   Pain   In / Out 0/10 0/10   Subjective Functional Status/Changes: \"Pain here and there, I can tolerate it, it occurs when I am walking or exercising a little bit but doesn't affect it. \"    Changes to:  Meds, Allergies, Med Hx, Sx Hx? If yes, update Summary List no      TREATMENT AREA =  Pain in left knee [M25.562]    OBJECTIVE  Modalities Rationale:      decrease post-exercise soreness  to improve patient's ability to progress to PLOF and address remaining functional goals. min [] Estim Unattended, type/location:                                      []  w/ice    []  w/heat    min [] Estim Attended, type/location:                                     []  w/US     []  w/ice    []  w/heat    []  TENS insruct      min []  Mechanical Traction: type/lbs                   []  pro   []  sup   []  int   []  cont    []  before manual    []  after manual    min []  Ultrasound, settings/location:      min []  Iontophoresis w/ dexamethasone, location:                                               []  take home patch       []  in clinic   10 min  unbill [x]  Ice (anterior)    [x]  Heat (posterior)   location/position: (L) knee with patient reclined    min []  Paraffin,  details:     min []  Vasopneumatic Device, press/temp:     min []  Julius Jimenez / Tiara Spell:     If using vaso (only need to measure limb vaso being performed on)      pre-treatment girth :       post-treatment girth :       measured at (landmark location) :      min []  Other:    Skin assessment post-treatment (if applicable):    [x]  intact    []  redness- no adverse reaction                 []redness - adverse reaction:          Therapeutic

## 2023-03-31 ENCOUNTER — HOSPITAL ENCOUNTER (OUTPATIENT)
Facility: HOSPITAL | Age: 23
Setting detail: RECURRING SERIES
End: 2023-03-31
Payer: COMMERCIAL

## 2023-03-31 PROCEDURE — 97112 NEUROMUSCULAR REEDUCATION: CPT

## 2023-03-31 PROCEDURE — 97530 THERAPEUTIC ACTIVITIES: CPT

## 2023-03-31 PROCEDURE — 97110 THERAPEUTIC EXERCISES: CPT

## 2023-03-31 NOTE — PROGRESS NOTES
PHYSICAL / OCCUPATIONAL THERAPY - DAILY TREATMENT NOTE (updated )    Patient Name: Antolin Franklin Date: 3/31/2023    : 2000  Insurance: Payor: Kalli Morel / Plan: Kalli Morel / Product Type: *No Product type* /      Patient  verified yes     Visit #   Current / Total 4 4   Time   In / Out 9:36 am 10:20 am   Pain   In / Out 0/10 0/10   Subjective Functional Status/Changes: Patient notes he is currently jogging 2 minutes, walking 3 minutes for 6 consecutive sets. Changes to:  Meds, Allergies, Med Hx, Sx Hx? If yes, update Summary List no      TREATMENT AREA =  Pain in left knee [M25.562]    OBJECTIVE  Modalities Rationale:      decrease post-exercise soreness  to improve patient's ability to progress to PLOF and address remaining functional goals. min [] Estim Unattended, type/location:                                      []  w/ice    []  w/heat    min [] Estim Attended, type/location:                                     []  w/US     []  w/ice    []  w/heat    []  TENS insruct      min []  Mechanical Traction: type/lbs                   []  pro   []  sup   []  int   []  cont    []  before manual    []  after manual    min []  Ultrasound, settings/location:      min []  Iontophoresis w/ dexamethasone, location:                                               []  take home patch       []  in clinic   10 min  unbill [x]  Ice (anterior)    [x]  Heat (posterior)   location/position: (L) knee with patient reclined    min []  Paraffin,  details:     min []  Vasopneumatic Device, press/temp:     min []  Kwan Jonathan / Ysabel Setters:     If using vaso (only need to measure limb vaso being performed on)      pre-treatment girth :       post-treatment girth :       measured at (landmark location) :      min []  Other:    Skin assessment post-treatment (if applicable):    [x]  intact    []  redness- no adverse reaction                 []redness - adverse reaction:          Therapeutic Procedures:  34    Total  MC BC Totals ER/ABD ROM during forward T modification as patient demos trunk compensation. Patient will continue to benefit from skilled PT / OT services to modify and progress therapeutic interventions, analyze and address functional mobility deficits, analyze and address ROM deficits, analyze and address strength deficits, analyze and address soft tissue restrictions, analyze and cue for proper movement patterns, analyze and modify for postural abnormalities, analyze and address imbalance/dizziness, and instruct in home and community integration to address functional deficits and attain remaining goals. Progress toward goals / Updated goals:  [x]  See Progress Note/Recertification  Patient will be (I) with finalized HEP in preparation for D/C to self-management of symptoms. Patient to report FOTO score of at least 82/100 pts to demonstrate improved function and quality of life.        PLAN  [x] Continue plan of care  [x]  Upgrade activities as tolerated  []  Discharge due to :  [x]  Other: see PN; pt will schedule for 2 more weeks 2x/week then plan for D/C    Claudette Wall PTA    3/31/2023        Future Appointments   Date Time Provider Sanjiv Butcher   4/4/2023  1:00 PM Juan Mosley, PT [facility]   4/13/2023  9:30 AM Mannie Raman PT [facility]   4/14/2023  9:00 AM Claudette Wall PTA [facility]

## 2023-04-04 ENCOUNTER — HOSPITAL ENCOUNTER (OUTPATIENT)
Facility: HOSPITAL | Age: 23
Setting detail: RECURRING SERIES
Discharge: HOME | End: 2023-04-07
Payer: COMMERCIAL

## 2023-04-04 PROCEDURE — 97112 NEUROMUSCULAR REEDUCATION: CPT

## 2023-04-04 PROCEDURE — 97110 THERAPEUTIC EXERCISES: CPT

## 2023-04-04 PROCEDURE — 97530 THERAPEUTIC ACTIVITIES: CPT

## 2023-04-04 NOTE — PROGRESS NOTES
PHYSICAL / OCCUPATIONAL THERAPY - DAILY TREATMENT NOTE (updated )    Patient Name: Neelam Loredo. Date: 2023    : 2000  Insurance: Payor: Vero Jones / Plan: Vero Jones / Product Type: *No Product type* /      Patient  verified yes     Visit #   Current / Total 5 4-8   Time   In / Out 1:01 pm 1:50 am   Pain   In / Out 0/10 0/10   Subjective Functional Status/Changes: \"I have to stop to make turns\" when running   Changes to:  Meds, Allergies, Med Hx, Sx Hx? If yes, update Summary List no      TREATMENT AREA =  Pain in left knee [M25.562]    OBJECTIVE  Modalities Rationale:      decrease post-exercise soreness  to improve patient's ability to progress to PLOF and address remaining functional goals. min [] Estim Unattended, type/location:                                      []  w/ice    []  w/heat    min [] Estim Attended, type/location:                                     []  w/US     []  w/ice    []  w/heat    []  TENS insruct      min []  Mechanical Traction: type/lbs                   []  pro   []  sup   []  int   []  cont    []  before manual    []  after manual    min []  Ultrasound, settings/location:      min []  Iontophoresis w/ dexamethasone, location:                                               []  take home patch       []  in clinic   10 min  unbill [x]  Ice (anterior)    [x]  Heat (posterior)   location/position: (L) knee with patient reclined    min []  Paraffin,  details:     min []  Vasopneumatic Device, press/temp:     min []  Zita Hess / Nehal Marquis:     If using vaso (only need to measure limb vaso being performed on)      pre-treatment girth :       post-treatment girth :       measured at (landmark location) :      min []  Other:    Skin assessment post-treatment (if applicable):    [x]  intact    []  redness- no adverse reaction                 []redness - adverse reaction:          Therapeutic Procedures:  39    Total  MC BC Totals Reminder: bill using total billable min of TIMED

## 2023-04-04 NOTE — PROGRESS NOTES
reciprocal  SL RDL: mild genu valgus (R)>(L)    Progress toward goals / Updated goals:  [x]  See Progress Note/Recertification  Patient will be (I) with finalized HEP in preparation for D/C to self-management of symptoms. -Goal progressing; pt notes (I) with return to run program, but does not feel ready for DC at this time  Patient to report FOTO score of at least 82/100 pts to demonstrate improved function and quality of life. -Goal not met; 75/100     New Goals to achieved in 4 sessions:  Continue per established goals. RECOMMENDATIONS  Pt to continue skilled therapy 1-2 sessions per week for 2 sessions to improve ability to return to [facility] athletic endeavors with decreased risk for re-injury. If you have any questions/comments please contact us directly at [phone]   Thank you for allowing us to assist in the care of your patient.   PTA Signature: Arline Arteaga PTA Date: 4/4/2023   PT Signature: Farheen Moreno DPT Time: 4:36 pm

## 2023-04-14 ENCOUNTER — HOSPITAL ENCOUNTER (OUTPATIENT)
Facility: HOSPITAL | Age: 23
Setting detail: RECURRING SERIES
End: 2023-04-14
Payer: COMMERCIAL